# Patient Record
Sex: MALE | Race: BLACK OR AFRICAN AMERICAN
[De-identification: names, ages, dates, MRNs, and addresses within clinical notes are randomized per-mention and may not be internally consistent; named-entity substitution may affect disease eponyms.]

---

## 2018-03-19 ENCOUNTER — HOSPITAL ENCOUNTER (INPATIENT)
Dept: HOSPITAL 54 - ER | Age: 58
LOS: 3 days | Discharge: NURSING, REHAB OR LONG-TERM CARE | DRG: 377 | End: 2018-03-22
Attending: INTERNAL MEDICINE | Admitting: INTERNAL MEDICINE
Payer: MEDICARE

## 2018-03-19 VITALS — SYSTOLIC BLOOD PRESSURE: 110 MMHG | DIASTOLIC BLOOD PRESSURE: 82 MMHG

## 2018-03-19 VITALS — HEIGHT: 65 IN | WEIGHT: 184 LBS | BODY MASS INDEX: 30.66 KG/M2

## 2018-03-19 VITALS — DIASTOLIC BLOOD PRESSURE: 75 MMHG | SYSTOLIC BLOOD PRESSURE: 102 MMHG

## 2018-03-19 VITALS — DIASTOLIC BLOOD PRESSURE: 77 MMHG | SYSTOLIC BLOOD PRESSURE: 110 MMHG

## 2018-03-19 VITALS — DIASTOLIC BLOOD PRESSURE: 74 MMHG | SYSTOLIC BLOOD PRESSURE: 110 MMHG
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
## 2018-03-19 DIAGNOSIS — D62: ICD-10-CM

## 2018-03-19 DIAGNOSIS — Z93.1: ICD-10-CM

## 2018-03-19 DIAGNOSIS — N39.0: ICD-10-CM

## 2018-03-19 DIAGNOSIS — E43: ICD-10-CM

## 2018-03-19 DIAGNOSIS — R13.10: ICD-10-CM

## 2018-03-19 DIAGNOSIS — E11.22: ICD-10-CM

## 2018-03-19 DIAGNOSIS — K29.71: Primary | ICD-10-CM

## 2018-03-19 DIAGNOSIS — F03.90: ICD-10-CM

## 2018-03-19 DIAGNOSIS — R53.2: ICD-10-CM

## 2018-03-19 DIAGNOSIS — Z99.11: ICD-10-CM

## 2018-03-19 DIAGNOSIS — Z93.0: ICD-10-CM

## 2018-03-19 DIAGNOSIS — E86.0: ICD-10-CM

## 2018-03-19 DIAGNOSIS — M62.50: ICD-10-CM

## 2018-03-19 DIAGNOSIS — J96.11: ICD-10-CM

## 2018-03-19 DIAGNOSIS — M24.571: ICD-10-CM

## 2018-03-19 DIAGNOSIS — B95.4: ICD-10-CM

## 2018-03-19 DIAGNOSIS — E88.09: ICD-10-CM

## 2018-03-19 DIAGNOSIS — Z86.73: ICD-10-CM

## 2018-03-19 DIAGNOSIS — D50.0: ICD-10-CM

## 2018-03-19 DIAGNOSIS — E87.2: ICD-10-CM

## 2018-03-19 DIAGNOSIS — S01.311A: ICD-10-CM

## 2018-03-19 DIAGNOSIS — F09: ICD-10-CM

## 2018-03-19 DIAGNOSIS — N18.9: ICD-10-CM
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
## 2018-03-19 DIAGNOSIS — R65.10: ICD-10-CM

## 2018-03-19 DIAGNOSIS — D68.59: ICD-10-CM

## 2018-03-19 DIAGNOSIS — L89.610: ICD-10-CM

## 2018-03-19 DIAGNOSIS — D72.829: ICD-10-CM

## 2018-03-19 DIAGNOSIS — G93.1: ICD-10-CM

## 2018-03-19 DIAGNOSIS — R40.3: ICD-10-CM
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
## 2018-03-19 DIAGNOSIS — M24.572: ICD-10-CM

## 2018-03-19 DIAGNOSIS — Z79.4: ICD-10-CM

## 2018-03-19 LAB
ALBUMIN SERPL BCP-MCNC: 3.1 G/DL (ref 3.4–5)
ALP SERPL-CCNC: 55 U/L (ref 46–116)
ALT SERPL W P-5'-P-CCNC: 30 U/L (ref 12–78)
APTT PPP: 24 SEC (ref 23–34)
AST SERPL W P-5'-P-CCNC: 20 U/L (ref 15–37)
BASOPHILS # BLD AUTO: 0.1 /CMM (ref 0–0.2)
BASOPHILS NFR BLD AUTO: 0.8 % (ref 0–2)
BILIRUB DIRECT SERPL-MCNC: 0.1 MG/DL (ref 0–0.2)
BILIRUB SERPL-MCNC: 0.2 MG/DL (ref 0.2–1)
BUN SERPL-MCNC: 15 MG/DL (ref 7–18)
CALCIUM SERPL-MCNC: 9.1 MG/DL (ref 8.5–10.1)
CHLORIDE SERPL-SCNC: 107 MMOL/L (ref 98–107)
CO2 SERPL-SCNC: 29 MMOL/L (ref 21–32)
CREAT SERPL-MCNC: 1 MG/DL (ref 0.6–1.3)
EOSINOPHIL # BLD AUTO: 0.3 /CMM (ref 0–0.7)
EOSINOPHIL NFR BLD AUTO: 2.5 % (ref 0–6)
GLUCOSE SERPL-MCNC: 197 MG/DL (ref 74–106)
HCT VFR BLD AUTO: 35 % (ref 39–51)
HGB BLD-MCNC: 11.4 G/DL (ref 13.5–17.5)
INR PPP: 1.09 (ref 0.87–1.13)
LIPASE SERPL-CCNC: 88 U/L (ref 73–393)
LYMPHOCYTES NFR BLD AUTO: 2.8 /CMM (ref 0.8–4.8)
LYMPHOCYTES NFR BLD AUTO: 24.9 % (ref 20–44)
MCH RBC QN AUTO: 29 PG (ref 26–33)
MCHC RBC AUTO-ENTMCNC: 33 G/DL (ref 31–36)
MCV RBC AUTO: 88 FL (ref 80–96)
MONOCYTES NFR BLD AUTO: 0.7 /CMM (ref 0.1–1.3)
MONOCYTES NFR BLD AUTO: 6.7 % (ref 2–12)
NEUTROPHILS # BLD AUTO: 7.2 /CMM (ref 1.8–8.9)
NEUTROPHILS NFR BLD AUTO: 65.1 % (ref 43–81)
PH UR STRIP: 8.5 [PH] (ref 5–8)
PLATELET # BLD AUTO: 283 /CMM (ref 150–450)
POTASSIUM SERPL-SCNC: 4 MMOL/L (ref 3.5–5.1)
PROT SERPL-MCNC: 8.2 G/DL (ref 6.4–8.2)
RBC # BLD AUTO: 3.97 MIL/UL (ref 4.5–6)
RBC #/AREA URNS HPF: (no result) /HPF (ref 0–2)
RDW COEFFICIENT OF VARIATION: 16.7 (ref 11.5–15)
SODIUM SERPL-SCNC: 144 MMOL/L (ref 136–145)
TROPONIN I SERPL-MCNC: < 0.017 NG/ML (ref 0–0.06)
UROBILINOGEN UR STRIP-MCNC: 0.2 EU/DL
WBC #/AREA URNS HPF: (no result) /HPF (ref 0–3)
WBC NRBC COR # BLD AUTO: 11.1 K/UL (ref 4.3–11)

## 2018-03-19 PROCEDURE — A4216 STERILE WATER/SALINE, 10 ML: HCPCS

## 2018-03-19 PROCEDURE — Z7610: HCPCS

## 2018-03-19 PROCEDURE — A4606 OXYGEN PROBE USED W OXIMETER: HCPCS

## 2018-03-19 PROCEDURE — C1751 CATH, INF, PER/CENT/MIDLINE: HCPCS

## 2018-03-19 PROCEDURE — A6403 STERILE GAUZE>16 <= 48 SQ IN: HCPCS

## 2018-03-19 PROCEDURE — C9113 INJ PANTOPRAZOLE SODIUM, VIA: HCPCS

## 2018-03-19 PROCEDURE — A4349 DISPOSABLE MALE EXTERNAL CAT: HCPCS

## 2018-03-19 RX ADMIN — SODIUM CHLORIDE SCH MG: 9 INJECTION, SOLUTION INTRAVENOUS at 21:57

## 2018-03-19 RX ADMIN — HUMAN INSULIN PRN UNIT: 100 INJECTION, SOLUTION SUBCUTANEOUS at 22:11

## 2018-03-19 RX ADMIN — Medication SCH EACH: at 12:59

## 2018-03-19 RX ADMIN — DEXTROSE AND SODIUM CHLORIDE PRN MLS/HR: 5; 450 INJECTION, SOLUTION INTRAVENOUS at 13:00

## 2018-03-19 RX ADMIN — Medication SCH EACH: at 21:57

## 2018-03-19 RX ADMIN — SODIUM CHLORIDE SCH MG: 9 INJECTION, SOLUTION INTRAVENOUS at 12:59

## 2018-03-19 RX ADMIN — INSULIN HUMAN PRN UNIT: 100 INJECTION, SOLUTION PARENTERAL at 18:42

## 2018-03-19 RX ADMIN — Medication SCH EACH: at 18:41

## 2018-03-19 RX ADMIN — QUETIAPINE SCH MG: 25 TABLET, FILM COATED ORAL at 21:57

## 2018-03-19 RX ADMIN — INSULIN HUMAN PRN UNIT: 100 INJECTION, SOLUTION PARENTERAL at 14:29

## 2018-03-19 NOTE — NUR
CONOR FROM NURSING HOME DT GROUND COFFEE EMESIS SINCE THIS AM. PATIENT IS 
OBTUNDED, NOTED WITH TRACHE ON COOL AEROSOL. SATING WELL. PATIENT IS AFEBRILE. 
NO ABDOMINAL DISTENTION NOTED. AFEBRILE. VSS

## 2018-03-19 NOTE — NUR
TELE RN INITIAL NOTE



RECEIVED IN BED WITH OPEN EYES. HOB ELEVATED. TRACH WITH COOL AEROSOL AND SATURATING WELL. 
ON ASPIRATION PRECAUTIONS. GTUBE CLAMPED. NPO STATUS. NO FACIAL GRIMACE NOTED. IV R HAND 
CLEAN, INTACT WITH FLUIDS INFUSING. WILL CONTINUE TO MONITOR.

## 2018-03-19 NOTE — NUR
rn note

received pt on bed, obtunded, opens eyes, responses to pain, no sob, noted, suctioned prn, 
trach in place, on tele monitor, sr 67 bpm, g tube in place, clamped, no residual, wounds 
present, iv in r hand 20 g, intact, safety measures implemented, call light within reach, 
bed in low and locked position, bed alarm on. will monitor pt.

## 2018-03-20 VITALS — SYSTOLIC BLOOD PRESSURE: 103 MMHG | DIASTOLIC BLOOD PRESSURE: 67 MMHG

## 2018-03-20 VITALS — DIASTOLIC BLOOD PRESSURE: 75 MMHG | SYSTOLIC BLOOD PRESSURE: 110 MMHG

## 2018-03-20 VITALS — DIASTOLIC BLOOD PRESSURE: 80 MMHG | SYSTOLIC BLOOD PRESSURE: 110 MMHG

## 2018-03-20 VITALS — DIASTOLIC BLOOD PRESSURE: 85 MMHG | SYSTOLIC BLOOD PRESSURE: 122 MMHG

## 2018-03-20 VITALS — SYSTOLIC BLOOD PRESSURE: 118 MMHG | DIASTOLIC BLOOD PRESSURE: 76 MMHG

## 2018-03-20 VITALS — SYSTOLIC BLOOD PRESSURE: 115 MMHG | DIASTOLIC BLOOD PRESSURE: 77 MMHG

## 2018-03-20 VITALS — SYSTOLIC BLOOD PRESSURE: 110 MMHG | DIASTOLIC BLOOD PRESSURE: 80 MMHG

## 2018-03-20 LAB
BASOPHILS # BLD AUTO: 0.1 /CMM (ref 0–0.2)
BASOPHILS NFR BLD AUTO: 0.7 % (ref 0–2)
BUN SERPL-MCNC: 11 MG/DL (ref 7–18)
CALCIUM SERPL-MCNC: 8.3 MG/DL (ref 8.5–10.1)
CHLORIDE SERPL-SCNC: 108 MMOL/L (ref 98–107)
CO2 SERPL-SCNC: 25 MMOL/L (ref 21–32)
CREAT SERPL-MCNC: 0.8 MG/DL (ref 0.6–1.3)
EOSINOPHIL # BLD AUTO: 0.5 /CMM (ref 0–0.7)
EOSINOPHIL NFR BLD AUTO: 5.6 % (ref 0–6)
GLUCOSE SERPL-MCNC: 179 MG/DL (ref 74–106)
HCT VFR BLD AUTO: 35 % (ref 39–51)
HGB BLD-MCNC: 11.5 G/DL (ref 13.5–17.5)
LYMPHOCYTES NFR BLD AUTO: 3.7 /CMM (ref 0.8–4.8)
LYMPHOCYTES NFR BLD AUTO: 43.4 % (ref 20–44)
MAGNESIUM SERPL-MCNC: 1.9 MG/DL (ref 1.8–2.4)
MCH RBC QN AUTO: 30 PG (ref 26–33)
MCHC RBC AUTO-ENTMCNC: 33 G/DL (ref 31–36)
MCV RBC AUTO: 89 FL (ref 80–96)
MONOCYTES NFR BLD AUTO: 0.9 /CMM (ref 0.1–1.3)
MONOCYTES NFR BLD AUTO: 10.6 % (ref 2–12)
NEUTROPHILS # BLD AUTO: 3.4 /CMM (ref 1.8–8.9)
NEUTROPHILS NFR BLD AUTO: 39.7 % (ref 43–81)
PHOSPHATE SERPL-MCNC: 3.2 MG/DL (ref 2.5–4.9)
PLATELET # BLD AUTO: 228 /CMM (ref 150–450)
POTASSIUM SERPL-SCNC: 4.2 MMOL/L (ref 3.5–5.1)
RBC # BLD AUTO: 3.89 MIL/UL (ref 4.5–6)
RDW COEFFICIENT OF VARIATION: 16.5 (ref 11.5–15)
SODIUM SERPL-SCNC: 142 MMOL/L (ref 136–145)
WBC NRBC COR # BLD AUTO: 8.6 K/UL (ref 4.3–11)

## 2018-03-20 RX ADMIN — DEXTROSE AND SODIUM CHLORIDE PRN MLS/HR: 5; 450 INJECTION, SOLUTION INTRAVENOUS at 02:28

## 2018-03-20 RX ADMIN — Medication SCH EACH: at 21:33

## 2018-03-20 RX ADMIN — Medication SCH EACH: at 08:29

## 2018-03-20 RX ADMIN — Medication SCH EACH: at 11:30

## 2018-03-20 RX ADMIN — SODIUM CHLORIDE SCH MG: 9 INJECTION, SOLUTION INTRAVENOUS at 08:29

## 2018-03-20 RX ADMIN — Medication SCH EACH: at 16:36

## 2018-03-20 RX ADMIN — CITALOPRAM HYDROBROMIDE SCH MG: 10 TABLET ORAL at 08:26

## 2018-03-20 RX ADMIN — DEXTROSE AND SODIUM CHLORIDE PRN MLS/HR: 5; 450 INJECTION, SOLUTION INTRAVENOUS at 16:38

## 2018-03-20 RX ADMIN — Medication PRN ML: at 20:00

## 2018-03-20 RX ADMIN — SODIUM CHLORIDE SCH MG: 9 INJECTION, SOLUTION INTRAVENOUS at 21:15

## 2018-03-20 RX ADMIN — HUMAN INSULIN PRN UNIT: 100 INJECTION, SOLUTION SUBCUTANEOUS at 21:34

## 2018-03-20 RX ADMIN — QUETIAPINE SCH MG: 25 TABLET, FILM COATED ORAL at 21:15

## 2018-03-20 NOTE — NUR
TELE RN CLOSING NOTE



NO ACUTE DISTRESS NOTED. ALL NEEDS ATTENDED TO PROMPTLY. SUCTIONED AS NEEDED. REPOSITIONED 
Q2H. KEPT CLEAN AND DRY. NPO MAINTAINED. GTUBE IN PLACE AND CLAMPED. HOB ELEVATED. WILL 
ENDORSE TO NEXT SHIFT FOR CONTINUITY OF CARE.

## 2018-03-20 NOTE — NUR
MS/RN   S/B GI



Seen by GI - resume GT feedings, and then NPO from midnight for EGD tomorrow. Consent 
obtained from patient's wife Beverly.

## 2018-03-20 NOTE — NUR
MS/RN   End note



No changes in condition, awaiting feeding to be sent by [facility]. Safety measures in place, 
will endorse to night shift.

## 2018-03-20 NOTE — NUR
WOUND CARE CONSULT: PT PRESENTS WITH RT HEEL DEEP TISSUE INJURY (INTACT), DRY ABRASION TO RT 
EARLOBE EDGE NEAR FACE WITHOUT DRAINAGE, SACRAL SCARRING AND LEFT ANTERIOR LOWER LEG 
SCARRING, PRESENT ON ADMISSION. PT IS IMMOBILE WITH ARNULFO SCORE OF 11. PT ON ALEXUS 
ISOFLEX LOW AIRLOSS BED. ALL SKIN PROTECTION AND WOUND CARE RECOMMENDATIONS DISCUSSED WITH 
NURSING STAFF. WILL SEE PRN. MD IN AGREEMENT WITH PLAN OF CARE. 

-------------------------------------------------------------------------------

Addendum: 03/20/18 at 0941 by SHAHNAZ MOSHER WNDNU

-------------------------------------------------------------------------------

Amended: Links added.

## 2018-03-20 NOTE — NUR
MS/RN   Patient received



Patient received from night shift.

Trach to cool aerosol at 10 liters (portex #9), saturating 98%. Vital signs within normal 
range, no fever noted, also no signs of any active bleeding. Patient requiring turning every 
2-3 hours to prevent skin breakdown. Safety measures in place, will continue to monitor and 
ensure safety.

## 2018-03-20 NOTE — NUR
MS/RN   Medications



Morning IV medications administered as ordered, all oral medications held as patient remains 
NPO due to GI bleed.

## 2018-03-21 VITALS — SYSTOLIC BLOOD PRESSURE: 119 MMHG | DIASTOLIC BLOOD PRESSURE: 90 MMHG

## 2018-03-21 VITALS — DIASTOLIC BLOOD PRESSURE: 75 MMHG | SYSTOLIC BLOOD PRESSURE: 113 MMHG

## 2018-03-21 VITALS — DIASTOLIC BLOOD PRESSURE: 77 MMHG | SYSTOLIC BLOOD PRESSURE: 119 MMHG

## 2018-03-21 VITALS — SYSTOLIC BLOOD PRESSURE: 120 MMHG | DIASTOLIC BLOOD PRESSURE: 85 MMHG

## 2018-03-21 VITALS — DIASTOLIC BLOOD PRESSURE: 85 MMHG | SYSTOLIC BLOOD PRESSURE: 117 MMHG

## 2018-03-21 VITALS — DIASTOLIC BLOOD PRESSURE: 73 MMHG | SYSTOLIC BLOOD PRESSURE: 120 MMHG

## 2018-03-21 LAB
BASOPHILS # BLD AUTO: 0.1 /CMM (ref 0–0.2)
BASOPHILS NFR BLD AUTO: 1 % (ref 0–2)
BUN SERPL-MCNC: 9 MG/DL (ref 7–18)
CALCIUM SERPL-MCNC: 8.3 MG/DL (ref 8.5–10.1)
CHLORIDE SERPL-SCNC: 108 MMOL/L (ref 98–107)
CO2 SERPL-SCNC: 25 MMOL/L (ref 21–32)
CREAT SERPL-MCNC: 0.8 MG/DL (ref 0.6–1.3)
EOSINOPHIL # BLD AUTO: 0.5 /CMM (ref 0–0.7)
EOSINOPHIL NFR BLD AUTO: 5.9 % (ref 0–6)
GLUCOSE SERPL-MCNC: 179 MG/DL (ref 74–106)
HCT VFR BLD AUTO: 38 % (ref 39–51)
HGB BLD-MCNC: 12.5 G/DL (ref 13.5–17.5)
LYMPHOCYTES NFR BLD AUTO: 3.5 /CMM (ref 0.8–4.8)
LYMPHOCYTES NFR BLD AUTO: 43.4 % (ref 20–44)
MAGNESIUM SERPL-MCNC: 2.1 MG/DL (ref 1.8–2.4)
MCH RBC QN AUTO: 29 PG (ref 26–33)
MCHC RBC AUTO-ENTMCNC: 33 G/DL (ref 31–36)
MCV RBC AUTO: 89 FL (ref 80–96)
MONOCYTES NFR BLD AUTO: 0.9 /CMM (ref 0.1–1.3)
MONOCYTES NFR BLD AUTO: 11.3 % (ref 2–12)
NEUTROPHILS # BLD AUTO: 3.1 /CMM (ref 1.8–8.9)
NEUTROPHILS NFR BLD AUTO: 38.4 % (ref 43–81)
PLATELET # BLD AUTO: 254 /CMM (ref 150–450)
POTASSIUM SERPL-SCNC: 4 MMOL/L (ref 3.5–5.1)
RBC # BLD AUTO: 4.24 MIL/UL (ref 4.5–6)
RDW COEFFICIENT OF VARIATION: 16.3 (ref 11.5–15)
SODIUM SERPL-SCNC: 142 MMOL/L (ref 136–145)
WBC NRBC COR # BLD AUTO: 8 K/UL (ref 4.3–11)

## 2018-03-21 PROCEDURE — 0DB68ZX EXCISION OF STOMACH, VIA NATURAL OR ARTIFICIAL OPENING ENDOSCOPIC, DIAGNOSTIC: ICD-10-PCS

## 2018-03-21 RX ADMIN — Medication SCH EACH: at 12:22

## 2018-03-21 RX ADMIN — NEOMYCIN AND POLYMYXIN B SULFATES AND BACITRACIN ZINC SCH GM: 400; 3.5; 5 OINTMENT TOPICAL at 09:00

## 2018-03-21 RX ADMIN — SODIUM CHLORIDE SCH MG: 9 INJECTION, SOLUTION INTRAVENOUS at 21:57

## 2018-03-21 RX ADMIN — INSULIN HUMAN PRN UNIT: 100 INJECTION, SOLUTION PARENTERAL at 12:43

## 2018-03-21 RX ADMIN — DEXTROSE AND SODIUM CHLORIDE PRN MLS/HR: 5; 450 INJECTION, SOLUTION INTRAVENOUS at 22:51

## 2018-03-21 RX ADMIN — QUETIAPINE SCH MG: 25 TABLET, FILM COATED ORAL at 21:57

## 2018-03-21 RX ADMIN — DEXTROSE AND SODIUM CHLORIDE PRN MLS/HR: 5; 450 INJECTION, SOLUTION INTRAVENOUS at 04:41

## 2018-03-21 RX ADMIN — Medication SCH EACH: at 07:57

## 2018-03-21 RX ADMIN — Medication SCH EACH: at 21:57

## 2018-03-21 RX ADMIN — INSULIN HUMAN PRN UNIT: 100 INJECTION, SOLUTION PARENTERAL at 17:47

## 2018-03-21 RX ADMIN — Medication SCH EACH: at 17:42

## 2018-03-21 RX ADMIN — CITALOPRAM HYDROBROMIDE SCH MG: 10 TABLET ORAL at 08:13

## 2018-03-21 RX ADMIN — SODIUM CHLORIDE SCH MG: 9 INJECTION, SOLUTION INTRAVENOUS at 08:10

## 2018-03-21 NOTE — NUR
TELE RN OPENING NOTES

RECEIVED REPORT FROM AM RN. PATIENT OBTUNDED W/ TRACH INTACT. TOLERATING COOL AEROSOL, 
SATING @ %. ON TELE W/ SINUS RHYTHM, HR IN THE 80S. LEFT HAND IV #20 INTACT & PATENT 
W/ D5 1/2 NS @ 75 ML/HR. G-TUBE INTACT & FLUSHING WELL W/ GLYTROL @ 70 ML/HR. NO RESIDUAL 
NOTED @ THIS TIME. SKIN WARM, DRY & INTACT. NO S/S OF PAIN OR DISCOMFORT @ THIS TIME. SAFETY 
MEASURES IN PLACE W/ SIDE RAILS UP & BED LOCKED & IN LOWEST POSITION. WILL CONTINUE TO 
MONITOR.

## 2018-03-21 NOTE — NUR
RN NOTES

RECEIVED PT OBTUNDED, TRACH IN PLACE ON AEROSOL. GT CLAMPED, KEPT NPO. NO BLEEDING NOTED AT 
THIS TIME. SAFETY ENSURED SINUS JEANNIE ON THE MONITOR.

## 2018-03-21 NOTE — NUR
RN CLOSING NOTES

ENDORSED PT IN STABLE CONDITION; NO BLEEDING. IVF INFUSING WELL . GT FEEDING TOLERATED WELL; 
OB KEPT ELEVATED

## 2018-03-22 VITALS — DIASTOLIC BLOOD PRESSURE: 75 MMHG | SYSTOLIC BLOOD PRESSURE: 117 MMHG

## 2018-03-22 VITALS — SYSTOLIC BLOOD PRESSURE: 123 MMHG | DIASTOLIC BLOOD PRESSURE: 84 MMHG

## 2018-03-22 VITALS — DIASTOLIC BLOOD PRESSURE: 84 MMHG | SYSTOLIC BLOOD PRESSURE: 119 MMHG

## 2018-03-22 VITALS — SYSTOLIC BLOOD PRESSURE: 129 MMHG | DIASTOLIC BLOOD PRESSURE: 86 MMHG

## 2018-03-22 VITALS — DIASTOLIC BLOOD PRESSURE: 87 MMHG | SYSTOLIC BLOOD PRESSURE: 125 MMHG

## 2018-03-22 LAB
BASOPHILS # BLD AUTO: 0.1 /CMM (ref 0–0.2)
BASOPHILS NFR BLD AUTO: 1.1 % (ref 0–2)
BUN SERPL-MCNC: 8 MG/DL (ref 7–18)
CALCIUM SERPL-MCNC: 8 MG/DL (ref 8.5–10.1)
CHLORIDE SERPL-SCNC: 107 MMOL/L (ref 98–107)
CO2 SERPL-SCNC: 24 MMOL/L (ref 21–32)
CREAT SERPL-MCNC: 0.8 MG/DL (ref 0.6–1.3)
EOSINOPHIL # BLD AUTO: 0.4 /CMM (ref 0–0.7)
EOSINOPHIL NFR BLD AUTO: 4.7 % (ref 0–6)
GLUCOSE SERPL-MCNC: 219 MG/DL (ref 74–106)
HCT VFR BLD AUTO: 36 % (ref 39–51)
HGB BLD-MCNC: 11.9 G/DL (ref 13.5–17.5)
LYMPHOCYTES NFR BLD AUTO: 3.3 /CMM (ref 0.8–4.8)
LYMPHOCYTES NFR BLD AUTO: 37 % (ref 20–44)
MCH RBC QN AUTO: 29 PG (ref 26–33)
MCHC RBC AUTO-ENTMCNC: 33 G/DL (ref 31–36)
MCV RBC AUTO: 88 FL (ref 80–96)
MONOCYTES NFR BLD AUTO: 0.7 /CMM (ref 0.1–1.3)
MONOCYTES NFR BLD AUTO: 7.9 % (ref 2–12)
NEUTROPHILS # BLD AUTO: 4.4 /CMM (ref 1.8–8.9)
NEUTROPHILS NFR BLD AUTO: 49.3 % (ref 43–81)
PLATELET # BLD AUTO: 254 /CMM (ref 150–450)
POTASSIUM SERPL-SCNC: 4 MMOL/L (ref 3.5–5.1)
RBC # BLD AUTO: 4.08 MIL/UL (ref 4.5–6)
RDW COEFFICIENT OF VARIATION: 16.8 (ref 11.5–15)
SODIUM SERPL-SCNC: 140 MMOL/L (ref 136–145)
WBC NRBC COR # BLD AUTO: 8.9 K/UL (ref 4.3–11)

## 2018-03-22 PROCEDURE — B547ZZA ULTRASONOGRAPHY OF LEFT SUBCLAVIAN VEIN, GUIDANCE: ICD-10-PCS | Performed by: NURSE PRACTITIONER

## 2018-03-22 PROCEDURE — 05H633Z INSERTION OF INFUSION DEVICE INTO LEFT SUBCLAVIAN VEIN, PERCUTANEOUS APPROACH: ICD-10-PCS | Performed by: NURSE PRACTITIONER

## 2018-03-22 RX ADMIN — Medication SCH EACH: at 12:09

## 2018-03-22 RX ADMIN — Medication SCH EACH: at 08:18

## 2018-03-22 RX ADMIN — INSULIN HUMAN PRN UNIT: 100 INJECTION, SOLUTION PARENTERAL at 09:18

## 2018-03-22 RX ADMIN — CITALOPRAM HYDROBROMIDE SCH MG: 10 TABLET ORAL at 09:15

## 2018-03-22 RX ADMIN — INSULIN HUMAN PRN UNIT: 100 INJECTION, SOLUTION PARENTERAL at 12:12

## 2018-03-22 RX ADMIN — NEOMYCIN AND POLYMYXIN B SULFATES AND BACITRACIN ZINC SCH GM: 400; 3.5; 5 OINTMENT TOPICAL at 09:15

## 2018-03-22 RX ADMIN — Medication PRN ML: at 01:26

## 2018-03-22 RX ADMIN — SODIUM CHLORIDE SCH MG: 9 INJECTION, SOLUTION INTRAVENOUS at 09:15

## 2018-03-22 NOTE — NUR
RN NOTE

 PT DISCHARGED TO [facility] REPORT GIVEN TO DESIREE BOLES, [phone], PT STABLE, 
PICTURES TAKEN, PT HAD NO BELONGINGS, EXIT CARE DONE, DISCHARGE INSTRUCTIONS AND PAPERWORK 
GIVEN TO AMBULANCE, WIFE IS AWARE, PT HAD CONDOM CATH, G TUBE AND MIDLINE IN PLACE. 
PERIPHERAL IV AND ID BANDS REMOVED, AND PT LEFT VIA AMBULANCE.

## 2018-06-16 ENCOUNTER — HOSPITAL ENCOUNTER (INPATIENT)
Dept: HOSPITAL 54 - ER | Age: 58
LOS: 11 days | Discharge: NURSING, REHAB OR LONG-TERM CARE | DRG: 870 | End: 2018-06-27
Attending: NURSE PRACTITIONER | Admitting: NURSE PRACTITIONER
Payer: MEDICARE

## 2018-06-16 VITALS — WEIGHT: 208 LBS | BODY MASS INDEX: 32.65 KG/M2 | HEIGHT: 67 IN

## 2018-06-16 VITALS — DIASTOLIC BLOOD PRESSURE: 69 MMHG | SYSTOLIC BLOOD PRESSURE: 107 MMHG

## 2018-06-16 DIAGNOSIS — Z79.899: ICD-10-CM

## 2018-06-16 DIAGNOSIS — J32.0: ICD-10-CM

## 2018-06-16 DIAGNOSIS — R53.2: ICD-10-CM

## 2018-06-16 DIAGNOSIS — L89.610: ICD-10-CM

## 2018-06-16 DIAGNOSIS — E87.1: ICD-10-CM

## 2018-06-16 DIAGNOSIS — K76.9: ICD-10-CM

## 2018-06-16 DIAGNOSIS — Z87.820: ICD-10-CM

## 2018-06-16 DIAGNOSIS — J96.21: ICD-10-CM

## 2018-06-16 DIAGNOSIS — K56.7: ICD-10-CM

## 2018-06-16 DIAGNOSIS — J98.11: ICD-10-CM

## 2018-06-16 DIAGNOSIS — G93.41: ICD-10-CM

## 2018-06-16 DIAGNOSIS — E11.9: ICD-10-CM

## 2018-06-16 DIAGNOSIS — A41.9: Primary | ICD-10-CM

## 2018-06-16 DIAGNOSIS — Z93.0: ICD-10-CM

## 2018-06-16 DIAGNOSIS — R13.10: ICD-10-CM

## 2018-06-16 DIAGNOSIS — N39.0: ICD-10-CM

## 2018-06-16 DIAGNOSIS — D62: ICD-10-CM

## 2018-06-16 DIAGNOSIS — M62.479: ICD-10-CM

## 2018-06-16 DIAGNOSIS — B96.4: ICD-10-CM

## 2018-06-16 DIAGNOSIS — K21.9: ICD-10-CM

## 2018-06-16 DIAGNOSIS — D72.1: ICD-10-CM

## 2018-06-16 DIAGNOSIS — G40.909: ICD-10-CM

## 2018-06-16 DIAGNOSIS — E87.6: ICD-10-CM

## 2018-06-16 DIAGNOSIS — N17.0: ICD-10-CM

## 2018-06-16 DIAGNOSIS — E87.2: ICD-10-CM

## 2018-06-16 DIAGNOSIS — Z99.11: ICD-10-CM

## 2018-06-16 DIAGNOSIS — E83.39: ICD-10-CM

## 2018-06-16 DIAGNOSIS — E43: ICD-10-CM

## 2018-06-16 DIAGNOSIS — Z79.4: ICD-10-CM

## 2018-06-16 DIAGNOSIS — E11.621: ICD-10-CM

## 2018-06-16 DIAGNOSIS — K92.2: ICD-10-CM

## 2018-06-16 DIAGNOSIS — J01.00: ICD-10-CM

## 2018-06-16 DIAGNOSIS — E87.5: ICD-10-CM

## 2018-06-16 DIAGNOSIS — Z93.1: ICD-10-CM

## 2018-06-16 DIAGNOSIS — J15.9: ICD-10-CM

## 2018-06-16 DIAGNOSIS — G93.1: ICD-10-CM

## 2018-06-16 DIAGNOSIS — E11.65: ICD-10-CM

## 2018-06-16 DIAGNOSIS — J15.6: ICD-10-CM

## 2018-06-16 DIAGNOSIS — D68.59: ICD-10-CM

## 2018-06-16 DIAGNOSIS — E86.1: ICD-10-CM

## 2018-06-16 DIAGNOSIS — F09: ICD-10-CM

## 2018-06-16 DIAGNOSIS — L97.419: ICD-10-CM

## 2018-06-16 DIAGNOSIS — R65.20: ICD-10-CM

## 2018-06-16 LAB
BUN SERPL-MCNC: 13 MG/DL (ref 7–18)
CALCIUM SERPL-MCNC: 8.5 MG/DL (ref 8.5–10.1)
CHLORIDE SERPL-SCNC: 98 MMOL/L (ref 98–107)
CO2 SERPL-SCNC: 22 MMOL/L (ref 21–32)
CREAT SERPL-MCNC: 1.1 MG/DL (ref 0.6–1.3)
GLUCOSE SERPL-MCNC: 357 MG/DL (ref 74–106)
HCT VFR BLD AUTO: 44 % (ref 39–51)
HGB BLD-MCNC: 14.5 G/DL (ref 13.5–17.5)
LYMPHOCYTES NFR BLD MANUAL: 9 % (ref 16–48)
MCHC RBC AUTO-ENTMCNC: 33 G/DL (ref 31–36)
MCV RBC AUTO: 90 FL (ref 80–96)
MONOCYTES NFR BLD MANUAL: 4 % (ref 0–11)
NEUTS BAND NFR BLD MANUAL: 15 % (ref 0–5)
NEUTS SEG NFR BLD MANUAL: 72 % (ref 42–76)
PH UR STRIP: 7.5 [PH] (ref 5–8)
PLATELET # BLD AUTO: 244 /CMM (ref 150–450)
POTASSIUM SERPL-SCNC: 4.6 MMOL/L (ref 3.5–5.1)
RBC # BLD AUTO: 4.86 MIL/UL (ref 4.5–6)
RBC #/AREA URNS HPF: (no result) /HPF (ref 0–2)
RDW COEFFICIENT OF VARIATION: 17.7 (ref 11.5–15)
SODIUM SERPL-SCNC: 133 MMOL/L (ref 136–145)
UROBILINOGEN UR STRIP-MCNC: 0.2 EU/DL
WBC #/AREA URNS HPF: (no result) /HPF
WBC #/AREA URNS HPF: (no result) /HPF (ref 0–3)
WBC NRBC COR # BLD AUTO: 17.6 K/UL (ref 4.3–11)

## 2018-06-16 PROCEDURE — A6403 STERILE GAUZE>16 <= 48 SQ IN: HCPCS

## 2018-06-16 PROCEDURE — Z7610: HCPCS

## 2018-06-16 PROCEDURE — A4216 STERILE WATER/SALINE, 10 ML: HCPCS

## 2018-06-16 PROCEDURE — C9113 INJ PANTOPRAZOLE SODIUM, VIA: HCPCS

## 2018-06-16 PROCEDURE — 5A1955Z RESPIRATORY VENTILATION, GREATER THAN 96 CONSECUTIVE HOURS: ICD-10-PCS | Performed by: INTERNAL MEDICINE

## 2018-06-16 PROCEDURE — A4623 TRACHEOSTOMY INNER CANNULA: HCPCS

## 2018-06-16 PROCEDURE — A4606 OXYGEN PROBE USED W OXIMETER: HCPCS

## 2018-06-16 RX ADMIN — SODIUM CHLORIDE PRN MLS/HR: 9 INJECTION, SOLUTION INTRAVENOUS at 23:36

## 2018-06-16 NOTE — NUR
NOTED PT WITH FACIAL TWITCHING. ER MD MADE AWARE WITH ORDERS TO PLACE PT ON 
SEIZURE PRECAUTION. PLACE PT ON SEIZURE PRECAUTION WITH PADDED SIDERAILS.

## 2018-06-16 NOTE — NUR
RN NOTE

CRITICAL LAB VALUE LACTIC ACID 2.8, NOTIFIED TAHIRA CLIFFORD NP, NEW ORDER IS GIVEN AND CARRIED 
OUT, CHARGE NURSE IS AWARE

## 2018-06-16 NOTE — NUR
TO BED 8 BIB PARAMEDICS C/O FEVER AND TACHYCARDIA. PT CHRONIC TRACHE, ON T-TUBE 
MASK 15L. PLACE PT ON CARDIAC MONITORING, CONTINUOUS POX. SKI HOT TO TOUCH, 
NONDIAPHORETIC. PENDING ER SRIKANTH SILVA.

## 2018-06-16 NOTE — NUR
RN NOTE

RECEIVED PATIENT FROM ER, REPORT WAS PROVIDED BY ED, PATIENT IS LETHARGIC, DOES NOT FOLLOW 
COMMANDS ,DX SEPSIS DUE TO PNEUMONIA AND UTI, SINUS TACHYCARDIA ON THE MONITOR, LETHARGIC, 
HISTORY OF SEIZURE, SEIZURE PRECAUTIONS TAKEN, PEG TUBE IS INTACT, ON T-PIECE 6L/MIN, FIO2 
28%, NO RESPIRATORY DISTRESS NOTED,VITAL SIGNS TAKEN AND RECORDED, SKIN PICTURES TAKEN AND 
PLACED IN THE CHART, PELAYO CATH IS INTACT, DRAINS YELLOW, CLOUDY WITH SEDIMENT URINE, ALL 
SAFETY MEASURES TAKEN, BED IN THE LOWEST POSITION, CALL LIGHT WITHIN REACH, SIDE RAILS UP X 
2, WILL CONTINUE TO MONITOR PATIENT

## 2018-06-17 VITALS — SYSTOLIC BLOOD PRESSURE: 116 MMHG | DIASTOLIC BLOOD PRESSURE: 66 MMHG

## 2018-06-17 VITALS — SYSTOLIC BLOOD PRESSURE: 105 MMHG | DIASTOLIC BLOOD PRESSURE: 76 MMHG

## 2018-06-17 VITALS — DIASTOLIC BLOOD PRESSURE: 69 MMHG | SYSTOLIC BLOOD PRESSURE: 112 MMHG

## 2018-06-17 VITALS — DIASTOLIC BLOOD PRESSURE: 78 MMHG | SYSTOLIC BLOOD PRESSURE: 115 MMHG

## 2018-06-17 VITALS — SYSTOLIC BLOOD PRESSURE: 107 MMHG | DIASTOLIC BLOOD PRESSURE: 69 MMHG

## 2018-06-17 VITALS — SYSTOLIC BLOOD PRESSURE: 119 MMHG | DIASTOLIC BLOOD PRESSURE: 69 MMHG

## 2018-06-17 LAB
BASE EXCESS BLDA CALC-SCNC: -6.4 MMOL/L
BASOPHILS # BLD AUTO: 0.1 /CMM (ref 0–0.2)
BASOPHILS NFR BLD AUTO: 0.5 % (ref 0–2)
BILIRUB DIRECT SERPL-MCNC: 0 MG/DL (ref 0–0.2)
BILIRUB SERPL-MCNC: 0.4 MG/DL (ref 0.2–1)
BUN SERPL-MCNC: 12 MG/DL (ref 7–18)
CALCIUM SERPL-MCNC: 8.1 MG/DL (ref 8.5–10.1)
CHLORIDE SERPL-SCNC: 103 MMOL/L (ref 98–107)
CHOLEST SERPL-MCNC: 363 MG/DL (ref ?–200)
CO2 SERPL-SCNC: 21 MMOL/L (ref 21–32)
CREAT SERPL-MCNC: 1.1 MG/DL (ref 0.6–1.3)
DO-HGB MFR BLDA: 112.1 MMHG
EOSINOPHIL NFR BLD AUTO: 0.3 % (ref 0–6)
GLUCOSE SERPL-MCNC: 365 MG/DL (ref 74–106)
HCT VFR BLD AUTO: 42 % (ref 39–51)
HDLC SERPL-MCNC: 42 MG/DL (ref 40–60)
HGB BLD-MCNC: 14 G/DL (ref 13.5–17.5)
INHALED O2 CONCENTRATION: 28 %
INHALED O2 FLOW RATE: 5 L/MIN (ref 0–30)
LDLC SERPL DIRECT ASSAY-MCNC: 83 MG/DL (ref 0–99)
LYMPHOCYTES NFR BLD AUTO: 1.4 /CMM (ref 0.8–4.8)
LYMPHOCYTES NFR BLD AUTO: 10.6 % (ref 20–44)
MAGNESIUM SERPL-MCNC: 2 MG/DL (ref 1.8–2.4)
MCHC RBC AUTO-ENTMCNC: 33 G/DL (ref 31–36)
MCV RBC AUTO: 90 FL (ref 80–96)
MONOCYTES NFR BLD AUTO: 0.5 /CMM (ref 0.1–1.3)
MONOCYTES NFR BLD AUTO: 3.7 % (ref 2–12)
NEUTROPHILS # BLD AUTO: 11.5 /CMM (ref 1.8–8.9)
NEUTROPHILS NFR BLD AUTO: 84.9 % (ref 43–81)
PCO2 TEMP ADJ BLDA: 27.2 MMHG (ref 35–45)
PH TEMP ADJ BLDA: 7.41 [PH] (ref 7.35–7.45)
PHOSPHATE SERPL-MCNC: 2.2 MG/DL (ref 2.5–4.9)
PLATELET # BLD AUTO: 197 /CMM (ref 150–450)
PO2 TEMP ADJ BLDA: 55.4 MMHG (ref 75–100)
POTASSIUM SERPL-SCNC: 4.2 MMOL/L (ref 3.5–5.1)
RBC # BLD AUTO: 4.69 MIL/UL (ref 4.5–6)
RDW COEFFICIENT OF VARIATION: 17.9 (ref 11.5–15)
SAO2 % BLDA: 88.2 % (ref 92–98.5)
SODIUM SERPL-SCNC: 137 MMOL/L (ref 136–145)
TRIGL SERPL-MCNC: 1231 MG/DL (ref 30–150)
VENTILATION MODE VENT: (no result)
WBC NRBC COR # BLD AUTO: 13.5 K/UL (ref 4.3–11)

## 2018-06-17 RX ADMIN — ALBUTEROL SULFATE PRN MG: 2.5 SOLUTION RESPIRATORY (INHALATION) at 01:21

## 2018-06-17 RX ADMIN — Medication SCH EACH: at 00:52

## 2018-06-17 RX ADMIN — DEXTROSE MONOHYDRATE PRN MG: 50 INJECTION, SOLUTION INTRAVENOUS at 21:23

## 2018-06-17 RX ADMIN — DEXTROSE MONOHYDRATE SCH MLS/HR: 50 INJECTION, SOLUTION INTRAVENOUS at 22:01

## 2018-06-17 RX ADMIN — Medication PRN MG: at 13:22

## 2018-06-17 RX ADMIN — DEXTROSE MONOHYDRATE PRN MG: 50 INJECTION, SOLUTION INTRAVENOUS at 15:22

## 2018-06-17 RX ADMIN — DEXTROSE MONOHYDRATE SCH MLS/HR: 50 INJECTION, SOLUTION INTRAVENOUS at 16:06

## 2018-06-17 RX ADMIN — ACETAMINOPHEN PRN MG: 650 SUPPOSITORY RECTAL at 03:45

## 2018-06-17 RX ADMIN — Medication PRN MG: at 07:35

## 2018-06-17 RX ADMIN — SODIUM CHLORIDE PRN MLS/HR: 9 INJECTION, SOLUTION INTRAVENOUS at 15:21

## 2018-06-17 RX ADMIN — ACETAMINOPHEN PRN MG: 650 SUPPOSITORY RECTAL at 21:23

## 2018-06-17 RX ADMIN — ALBUTEROL SULFATE PRN MG: 2.5 SOLUTION RESPIRATORY (INHALATION) at 13:22

## 2018-06-17 RX ADMIN — INSULIN HUMAN PRN UNIT: 100 INJECTION, SOLUTION PARENTERAL at 05:39

## 2018-06-17 RX ADMIN — Medication PRN ML: at 00:50

## 2018-06-17 RX ADMIN — Medication SCH EACH: at 11:43

## 2018-06-17 RX ADMIN — PIPERACILLIN SODIUM AND TAZOBACTAM SODIUM SCH MLS/HR: .375; 3 INJECTION, POWDER, LYOPHILIZED, FOR SOLUTION INTRAVENOUS at 21:04

## 2018-06-17 RX ADMIN — DEXTROSE MONOHYDRATE PRN MG: 50 INJECTION, SOLUTION INTRAVENOUS at 09:57

## 2018-06-17 RX ADMIN — Medication PRN MG: at 01:21

## 2018-06-17 RX ADMIN — ENOXAPARIN SODIUM SCH MG: 40 INJECTION SUBCUTANEOUS at 21:05

## 2018-06-17 RX ADMIN — INSULIN HUMAN PRN UNIT: 100 INJECTION, SOLUTION PARENTERAL at 11:51

## 2018-06-17 RX ADMIN — INSULIN HUMAN PRN UNIT: 100 INJECTION, SOLUTION PARENTERAL at 00:57

## 2018-06-17 RX ADMIN — Medication SCH EACH: at 05:39

## 2018-06-17 RX ADMIN — ALBUTEROL SULFATE PRN MG: 2.5 SOLUTION RESPIRATORY (INHALATION) at 07:35

## 2018-06-17 RX ADMIN — INSULIN HUMAN PRN UNIT: 100 INJECTION, SOLUTION PARENTERAL at 18:31

## 2018-06-17 RX ADMIN — Medication SCH EACH: at 18:26

## 2018-06-17 NOTE — NUR
rn note

NOTIFIED NP GILDARDO ARMSTRONG ABOUT LOW URINE OUTPUT, 300 ML, HE ORDERED BOLUS  ML, 
PROCALCITONIN LEVEL REPORTED, HE ORDERED TO CHECK IT AGAIN IN AM. WILL ORDER AND CARRY OUT 
ORDERS. WILL ENDORSE TO NIGHT SHIFT NURSE.

## 2018-06-17 NOTE — NUR
RT NOTE: 



PT WAS PLACED ON VENT W SETTINGS NOTED PER DR. HOFFMANN. VENT IS PLUGGED INTO THE RED OUTLET W 
AMBUBAG BY BEDSIDE. PT IS TOLERATING WELL. NURSE AWARE. NO DISTRESS NOTED AT THIS TIME. WILL 
CONTINUE TO MONITOR.

-------------------------------------------------------------------------------

Addendum: 06/17/18 at 0550 by RENEE CANALES RT

-------------------------------------------------------------------------------

Amended: Links added.

## 2018-06-17 NOTE — NUR
RN NOTE

SO2 86% ON 6L/MIN, FIO2 28%, INCREASED OXYGEN, ELEVATED HOB, SO2 90%, NOTIFIED TAHIRA CLIFFORD 
NP, NEW ORDERS:STAT ABG'S, PUT ON MECHANICAL VENTILATOR GIVEN, CHARGE NURSE IS AWARE

## 2018-06-17 NOTE — NUR
RN NOTE

PT HAD GASTRIC  RESIDUAL 200 ML IN AM, AT NOON RESIDUAL 300 ML WITH TUBE FEEDING BEING ON 
HOLD, LATER PT VOMITED YELLOW LIQUID EMESIS X1. ZOFRAN GIVEN, MD NOTIFIED, XRAY KUB ORDERED. 
WILL MONITOR PT. SAFETY MEASURES IN PLACE.

## 2018-06-17 NOTE — NUR
RN NOTE

RESIDUAL OF 200CC NOTED, COFFEE GROUND COLOR, NOTIFIED TAHIRA CLIFFORD NP,  TAHIRA CLIFFORD NP ORDERED 
NPO STATUS, ORDER WAS CARRIED OUT, CHARGE NURSE IS AWARE, WILL CONTINUE TO MONITOR, ALL 
SAFETY MEASURES TAKEN

## 2018-06-17 NOTE — NUR
RN NOTE

NOTIFIED TAHIRA CLIFFORD NP REGARDING G-TUBE RESIDUAL OF 150CC, VENESSA HOFFMANN NP ORDERED TO STOP 
FEEDING FOR AN HOUR AND RECHECK. ADDITIONALLY NOTIFIED JOEL HOFFMANN REGARDING  ST ON 
THE MONITOR, PER TAHIRA NP CONTINUE TO MONITOR

## 2018-06-18 VITALS — SYSTOLIC BLOOD PRESSURE: 106 MMHG | DIASTOLIC BLOOD PRESSURE: 75 MMHG

## 2018-06-18 VITALS — SYSTOLIC BLOOD PRESSURE: 102 MMHG | DIASTOLIC BLOOD PRESSURE: 66 MMHG

## 2018-06-18 VITALS — DIASTOLIC BLOOD PRESSURE: 65 MMHG | SYSTOLIC BLOOD PRESSURE: 122 MMHG

## 2018-06-18 VITALS — SYSTOLIC BLOOD PRESSURE: 99 MMHG | DIASTOLIC BLOOD PRESSURE: 68 MMHG

## 2018-06-18 VITALS — DIASTOLIC BLOOD PRESSURE: 72 MMHG | SYSTOLIC BLOOD PRESSURE: 104 MMHG

## 2018-06-18 VITALS — DIASTOLIC BLOOD PRESSURE: 84 MMHG | SYSTOLIC BLOOD PRESSURE: 115 MMHG

## 2018-06-18 LAB
BUN SERPL-MCNC: 14 MG/DL (ref 7–18)
CALCIUM SERPL-MCNC: 7.9 MG/DL (ref 8.5–10.1)
CHLORIDE SERPL-SCNC: 105 MMOL/L (ref 98–107)
CO2 SERPL-SCNC: 25 MMOL/L (ref 21–32)
CREAT SERPL-MCNC: 1.2 MG/DL (ref 0.6–1.3)
GLUCOSE SERPL-MCNC: 323 MG/DL (ref 74–106)
HCT VFR BLD AUTO: 32 % (ref 39–51)
HCT VFR BLD AUTO: 34 % (ref 39–51)
HGB BLD-MCNC: 10.1 G/DL (ref 13.5–17.5)
HGB BLD-MCNC: 10.8 G/DL (ref 13.5–17.5)
LYMPHOCYTES NFR BLD MANUAL: 9 % (ref 16–48)
MCHC RBC AUTO-ENTMCNC: 32 G/DL (ref 31–36)
MCV RBC AUTO: 92 FL (ref 80–96)
MONOCYTES NFR BLD MANUAL: 1 % (ref 0–11)
NEUTS BAND NFR BLD MANUAL: 39 % (ref 0–5)
NEUTS SEG NFR BLD MANUAL: 51 % (ref 42–76)
PLATELET # BLD AUTO: 191 /CMM (ref 150–450)
POTASSIUM SERPL-SCNC: 3.7 MMOL/L (ref 3.5–5.1)
RBC # BLD AUTO: 3.65 MIL/UL (ref 4.5–6)
RDW COEFFICIENT OF VARIATION: 18.5 (ref 11.5–15)
SODIUM SERPL-SCNC: 139 MMOL/L (ref 136–145)
WBC NRBC COR # BLD AUTO: 18.3 K/UL (ref 4.3–11)

## 2018-06-18 RX ADMIN — Medication SCH EACH: at 23:47

## 2018-06-18 RX ADMIN — DEXTROSE MONOHYDRATE SCH MLS/HR: 50 INJECTION, SOLUTION INTRAVENOUS at 22:36

## 2018-06-18 RX ADMIN — Medication PRN MG: at 07:50

## 2018-06-18 RX ADMIN — INSULIN HUMAN PRN UNIT: 100 INJECTION, SOLUTION PARENTERAL at 06:04

## 2018-06-18 RX ADMIN — PIPERACILLIN SODIUM AND TAZOBACTAM SODIUM SCH MLS/HR: .375; 3 INJECTION, POWDER, LYOPHILIZED, FOR SOLUTION INTRAVENOUS at 17:13

## 2018-06-18 RX ADMIN — DEXTROSE MONOHYDRATE SCH MLS/HR: 50 INJECTION, SOLUTION INTRAVENOUS at 17:01

## 2018-06-18 RX ADMIN — PIPERACILLIN SODIUM AND TAZOBACTAM SODIUM SCH MLS/HR: .375; 3 INJECTION, POWDER, LYOPHILIZED, FOR SOLUTION INTRAVENOUS at 11:42

## 2018-06-18 RX ADMIN — SODIUM CHLORIDE PRN MLS/HR: 9 INJECTION, SOLUTION INTRAVENOUS at 10:51

## 2018-06-18 RX ADMIN — Medication SCH EACH: at 11:37

## 2018-06-18 RX ADMIN — INSULIN HUMAN PRN UNIT: 100 INJECTION, SOLUTION PARENTERAL at 23:50

## 2018-06-18 RX ADMIN — Medication SCH EACH: at 00:17

## 2018-06-18 RX ADMIN — PIPERACILLIN SODIUM AND TAZOBACTAM SODIUM SCH MLS/HR: .375; 3 INJECTION, POWDER, LYOPHILIZED, FOR SOLUTION INTRAVENOUS at 23:50

## 2018-06-18 RX ADMIN — PIPERACILLIN SODIUM AND TAZOBACTAM SODIUM SCH MLS/HR: .375; 3 INJECTION, POWDER, LYOPHILIZED, FOR SOLUTION INTRAVENOUS at 06:10

## 2018-06-18 RX ADMIN — ENOXAPARIN SODIUM SCH MG: 40 INJECTION SUBCUTANEOUS at 20:17

## 2018-06-18 RX ADMIN — ALBUTEROL SULFATE PRN MG: 2.5 SOLUTION RESPIRATORY (INHALATION) at 07:50

## 2018-06-18 RX ADMIN — INSULIN HUMAN PRN UNIT: 100 INJECTION, SOLUTION PARENTERAL at 11:44

## 2018-06-18 RX ADMIN — SODIUM CHLORIDE SCH MG: 9 INJECTION, SOLUTION INTRAVENOUS at 09:32

## 2018-06-18 RX ADMIN — Medication SCH EACH: at 06:03

## 2018-06-18 RX ADMIN — INSULIN HUMAN PRN UNIT: 100 INJECTION, SOLUTION PARENTERAL at 00:19

## 2018-06-18 RX ADMIN — SODIUM CHLORIDE SCH MG: 9 INJECTION, SOLUTION INTRAVENOUS at 17:03

## 2018-06-18 RX ADMIN — INSULIN HUMAN PRN UNIT: 100 INJECTION, SOLUTION PARENTERAL at 17:13

## 2018-06-18 RX ADMIN — Medication SCH EACH: at 17:11

## 2018-06-18 RX ADMIN — DEXTROSE MONOHYDRATE SCH MLS/HR: 50 INJECTION, SOLUTION INTRAVENOUS at 06:26

## 2018-06-18 NOTE — NUR
TD/RN     AM SHIFT INITIAL NOTES



RECEIVED PT ASLEEP IN BED, OPEN EYES, OBTUNDED, NO ACUTE CHANGE OF CONDITION OR RESPIRATORY 
DISTRESS BUT NOTED WITH ELEVATED TEMP 100.3, COOLING MEASURES ON GOING. VENT DEPENDENT WITH 
RATES SET AS PRESCRIBED, SATURATING @ 97%, LUNG SOUNDS CLEAR, SUCTIONED FOR AIRWAY 
CLEARANCE. ON TELE MONITORING, SINUS TACHY, . PT WITH ON GOING IV INFUSION OF NS @ 
75CC/HR, IV SITES FLUSHED, PATENT WITH NO S/S OF INFECTION.  GT FEEDING ON HOLD, AS REPORTED 
BY PM NURSE D/T COFFEE GROUNDS VOMIT LAST NIGHT. PELAYO CATHETER INTACT WITH CLOUDY YELLOW, 
SEDIMENTS URINE OUTPUT.  SCHEDULED AM MEDS TO BE GIVEN.  CL WITHIN REACHED AND SAFETY 
MAINTAINED.  ON GOING MONITORING.

## 2018-06-18 NOTE — NUR
RN NOTE

COFFEE GROUND EMESIS X 2, NOTIFIED TAHIRA CLIFFORD NP, PER TAHIRA CLIFFORD NP NEW ORDER OF PROTONIX 
40MG TWICE A DAY AND  ENDORSE TO AM SHIFT TO FOLLOW UP ON GI CONSULT AND CONTINUE TO 
MONITOR, ALSO NOTIFIED TAHIRA CLIFFORD NP THAT TEMPERATURE DROPPED FROM 101.6F .2F AFTER 
ADMINISTRATION OF TYLENOL SUPPOSITORY AND COOL MEASURES, NO NEW ORDERS GIVEN AT THIS

## 2018-06-18 NOTE — NUR
TD/RN     ROUNDS - DR. BURNHAM



UPDATED PT'S CONDITION. PT SEEN & EXAMINED BY DR. BURNHAM.  NO NEW ORDERS RECEIVED AT THIS 
TIME.

## 2018-06-18 NOTE — NUR
TD/RN     AM SHIFT END NOTES



NO VOMITING INCIDENT OR ACUTE CHANGE OF CONDITION DURING THE SHIFT, ALL NEEDS MET. PT STILL 
NPO, WITH ON GOING IV FLUIDS, IV SITES PATENT WITH NO S/S OF INFECTION, PELAYO CATHETER 
INTACT.  PT ENDORSED TO PM NURSE TO CONTINUE CARE.  CL WITHIN REACHED AND SAFETY MAINTAINED.

## 2018-06-18 NOTE — NUR
RN NOTE

RECEIVED PATIENT IN THE BED, OBTUNDED, NO RESPIRATORY DISTRESS NOTED, NO S/S OF PAIN OR 
DISCOMFORT NOTED, PER MD PUT LOVENOX ON HOLD, ALL SAFETY MEASURES TAKEN, WILL CONTINUE TO 
MONITOR PATIENT

## 2018-06-18 NOTE — NUR
RN NOTE

NO VOMITING AT THIS TIME, HOB ELEVATED AT ALL TIMES, SEIZURE PRECAUTIONS TAKEN, TURNED AND 
REPOSITIONED Q 2 HOURS, AFEBRILE AT THIS TIME, STILL MD ZAINA IS AWARE, ALL SAFETY MEASURES 
TAKEN, NO RESPIRATORY DISTRESS NOTED, TURNED AND REPOSITION Q 2 HOURS, WILL ENDORSE TO AM 
SHIFT FOR HONEY

## 2018-06-19 VITALS — SYSTOLIC BLOOD PRESSURE: 96 MMHG | DIASTOLIC BLOOD PRESSURE: 71 MMHG

## 2018-06-19 VITALS — DIASTOLIC BLOOD PRESSURE: 71 MMHG | SYSTOLIC BLOOD PRESSURE: 98 MMHG

## 2018-06-19 VITALS — DIASTOLIC BLOOD PRESSURE: 60 MMHG | SYSTOLIC BLOOD PRESSURE: 91 MMHG

## 2018-06-19 VITALS — DIASTOLIC BLOOD PRESSURE: 66 MMHG | SYSTOLIC BLOOD PRESSURE: 96 MMHG

## 2018-06-19 VITALS — DIASTOLIC BLOOD PRESSURE: 73 MMHG | SYSTOLIC BLOOD PRESSURE: 102 MMHG

## 2018-06-19 VITALS — SYSTOLIC BLOOD PRESSURE: 96 MMHG | DIASTOLIC BLOOD PRESSURE: 66 MMHG

## 2018-06-19 LAB
BASOPHILS # BLD AUTO: 0 /CMM (ref 0–0.2)
BASOPHILS NFR BLD AUTO: 0.3 % (ref 0–2)
BUN SERPL-MCNC: 11 MG/DL (ref 7–18)
CALCIUM SERPL-MCNC: 8 MG/DL (ref 8.5–10.1)
CHLORIDE SERPL-SCNC: 106 MMOL/L (ref 98–107)
CO2 SERPL-SCNC: 24 MMOL/L (ref 21–32)
CREAT SERPL-MCNC: 0.9 MG/DL (ref 0.6–1.3)
EOSINOPHIL NFR BLD AUTO: 4 % (ref 0–6)
GLUCOSE SERPL-MCNC: 288 MG/DL (ref 74–106)
HCT VFR BLD AUTO: 29 % (ref 39–51)
HGB BLD-MCNC: 9.5 G/DL (ref 13.5–17.5)
LYMPHOCYTES NFR BLD AUTO: 1.9 /CMM (ref 0.8–4.8)
LYMPHOCYTES NFR BLD AUTO: 12.3 % (ref 20–44)
MCHC RBC AUTO-ENTMCNC: 33 G/DL (ref 31–36)
MCV RBC AUTO: 91 FL (ref 80–96)
MONOCYTES NFR BLD AUTO: 0.5 /CMM (ref 0.1–1.3)
MONOCYTES NFR BLD AUTO: 3.3 % (ref 2–12)
NEUTROPHILS # BLD AUTO: 12.2 /CMM (ref 1.8–8.9)
NEUTROPHILS NFR BLD AUTO: 80.1 % (ref 43–81)
PLATELET # BLD AUTO: 188 /CMM (ref 150–450)
POTASSIUM SERPL-SCNC: 3.8 MMOL/L (ref 3.5–5.1)
RBC # BLD AUTO: 3.19 MIL/UL (ref 4.5–6)
RDW COEFFICIENT OF VARIATION: 18 (ref 11.5–15)
SODIUM SERPL-SCNC: 138 MMOL/L (ref 136–145)
WBC NRBC COR # BLD AUTO: 15.2 K/UL (ref 4.3–11)

## 2018-06-19 RX ADMIN — ACETYLCYSTEINE SCH MG: 100 SOLUTION ORAL; RESPIRATORY (INHALATION) at 20:13

## 2018-06-19 RX ADMIN — DEXTROSE MONOHYDRATE SCH MLS/HR: 50 INJECTION, SOLUTION INTRAVENOUS at 22:13

## 2018-06-19 RX ADMIN — PIPERACILLIN SODIUM AND TAZOBACTAM SODIUM SCH MLS/HR: .375; 3 INJECTION, POWDER, LYOPHILIZED, FOR SOLUTION INTRAVENOUS at 17:46

## 2018-06-19 RX ADMIN — INSULIN HUMAN PRN UNIT: 100 INJECTION, SOLUTION PARENTERAL at 06:49

## 2018-06-19 RX ADMIN — SODIUM CHLORIDE SCH MG: 9 INJECTION, SOLUTION INTRAVENOUS at 08:34

## 2018-06-19 RX ADMIN — INSULIN HUMAN PRN UNIT: 100 INJECTION, SOLUTION PARENTERAL at 18:01

## 2018-06-19 RX ADMIN — Medication SCH EACH: at 17:50

## 2018-06-19 RX ADMIN — INSULIN HUMAN PRN UNIT: 100 INJECTION, SOLUTION PARENTERAL at 12:50

## 2018-06-19 RX ADMIN — PIPERACILLIN SODIUM AND TAZOBACTAM SODIUM SCH MLS/HR: .375; 3 INJECTION, POWDER, LYOPHILIZED, FOR SOLUTION INTRAVENOUS at 12:19

## 2018-06-19 RX ADMIN — Medication SCH EACH: at 12:20

## 2018-06-19 RX ADMIN — DEXTROSE MONOHYDRATE SCH MLS/HR: 50 INJECTION, SOLUTION INTRAVENOUS at 14:14

## 2018-06-19 RX ADMIN — PIPERACILLIN SODIUM AND TAZOBACTAM SODIUM SCH MLS/HR: .375; 3 INJECTION, POWDER, LYOPHILIZED, FOR SOLUTION INTRAVENOUS at 05:55

## 2018-06-19 RX ADMIN — Medication SCH EACH: at 06:49

## 2018-06-19 RX ADMIN — DEXTROSE MONOHYDRATE SCH MLS/HR: 50 INJECTION, SOLUTION INTRAVENOUS at 05:56

## 2018-06-19 RX ADMIN — ENOXAPARIN SODIUM SCH MG: 40 INJECTION SUBCUTANEOUS at 21:00

## 2018-06-19 RX ADMIN — SODIUM CHLORIDE SCH MG: 9 INJECTION, SOLUTION INTRAVENOUS at 17:46

## 2018-06-19 RX ADMIN — SODIUM CHLORIDE PRN MLS/HR: 9 INJECTION, SOLUTION INTRAVENOUS at 06:50

## 2018-06-19 NOTE — NUR
LE RN CLOSING NOTE



PATIENT IN BED, OBTUNDED, WITH FACIAL TWITCHING NOTED, WITH PORTEX 8 TO MECHANICAL VENT, 
SETTINGS AS ORDERED. TOLERATING WELL. BREATHING EVEN AND UNLABORED. TELEMETRY READS SR HR 
68, NO SIGNS OF PAIN OR DISCOMFORT, RT HAND G18 WITH NS AT 75 ML/HR INFUSING WELL. RT 
FOREARM G22 FLUSHES WELL, BOTH SITES CLEAR. NPO DUE TO VOMITING AND RESIDUAL OF COFFEE 
GROUND MATERIAL LAST NIGHT.  PELAYO CATH IN PLACE WITH ADEQUATE AMOUNT OF BLOOD TINGED URINE 
OUTPUT, 475 ML. DR. CORBETT AWARE. PM CARE AND PRESCRIBED WOUND TREATMENT DONE, TURNED AND 
REPOSITIONED Q 2 HOURS. ALL SAFETY MEASURES IN PLACE. ALL NEEDS MET AT THIS TIME. MADE 
COMFORTABLE. NO OTHER SIGNIFICANT CHANGE IN CONDITION. WILL ENDORSE TO NEXT SHIFT FOR HONEY.

## 2018-06-19 NOTE — NUR
LE RN NOTES



NOTIFIED DR. MAYRA GARCIA ABOUT MED RECON LIST NOT DONE AND ALSO THAT PATIENT HAD 2 EPISODES OF 
VOMITING LAST NIGHT OF COFFEE GROUND MATERIAL.



DR. CORBETT AT BEDSIDE EARLIER. JUST ORDERED TO CHECK ON MAGNESIUM LEVEL AND WILL FOLLOW UP 
TOMORROW.



KEEP NPO.

## 2018-06-19 NOTE — NUR
RN NOTE

PATIENT RESTED WELL AT NIGHT, NO RESPIRATORY DISTRESS NOTED, SEIZURE PRECAUTIONS TAKEN, 
TURNED AND REPOSITION Q 2 HOURS, NO S/S OF BLEEDING NOTED, ENDORSED TO AM SHIFT TO FOLLOW UP 
ON GI CONSULT AND MED RECON PER TAHIRA CLIFFORD ORDER, WILL ENDORSE TO AM SHIFT FOR HONEY

## 2018-06-19 NOTE — NUR
FLAVIA RN NOTES



ZOSYN IV STARTED. VANCO IV TO FOLLOW.

-------------------------------------------------------------------------------

Addendum: 06/19/18 at 1549 by ALVA RAMÍREZ RN

-------------------------------------------------------------------------------

CORRECTION:

 DISREGARD THIS DOCUMENTATION INTENDED FOR ANOTHER PATIENT.

## 2018-06-19 NOTE — NUR
RN NOTE

NOTIFIED TAHIRA CLIFFORD NP THAT GI CONSULT WAS NOT DONE AND MED RECON WAS NOT DONE, PER TAHIRA CLIFFORD NP "ENDORSE TO AM SHIFT SO MORNING SHIFT NURSE CAN FOLLOW UP WITH PRIMARY MD", WILL 
NOTIFY MORNING NURSE TO FOLLOW UP

## 2018-06-19 NOTE — NUR
LE RN AM NOTE



RECEIVED PATIENT IN THE BED, OBTUNDED, WITH FACIAL TWITCHING NOTED, PER NIGHT SHIFT PT HAS 
BEEN LIKE THIS WHEN AWAKE, WITH PORTEX 8 TO MECHANICAL VENT, SETTINGS AS ORDERED. TOLERATING 
WELL. BREATHING EVEN AND UNLABORED. TELEMETRY READS SR HR 66, NO SIGNS OF PAIN OR 
DISCOMFORT, RT HAND G18 WITH NS AT 75 ML/HR INFUSING WELL. RT FOREARM G22 FLUSHES WELL, BOTH 
SITES CLEAR. NPO DUE TO VOMITING AND RESIDUAL OF COFFEE GROUND MATERIAL LAST NIGHT.  SEE 
NURSING FLOWSHEET FOR SKIN ISSUES. PELAYO CATH IN PLACE WITH ADEQUATE AMOUNT OF URINE OUTPUT. 
PER MD PUT LOVENOX ON HOLD, WILL TURN AND REPOSITION Q 2 HOURS. ALL SAFETY MEASURES IN 
PLACE. WILL CONTINUE TO MONITOR PATIENT

## 2018-06-19 NOTE — NUR
FLAVIA RAMÍREZ NOTES



TAYLOR. BS 90 MG/DL. NO INSULIN COVERAGE GIVEN.

-------------------------------------------------------------------------------

Addendum: 06/19/18 at 1549 by ALVA RAMÍREZ RN

-------------------------------------------------------------------------------

CORRECTION DISREGARD THIS DOCUMENTATION INTENDED FOR ANOTHER PATIENT.

## 2018-06-20 VITALS — SYSTOLIC BLOOD PRESSURE: 104 MMHG | DIASTOLIC BLOOD PRESSURE: 68 MMHG

## 2018-06-20 VITALS — DIASTOLIC BLOOD PRESSURE: 79 MMHG | SYSTOLIC BLOOD PRESSURE: 111 MMHG

## 2018-06-20 VITALS — DIASTOLIC BLOOD PRESSURE: 84 MMHG | SYSTOLIC BLOOD PRESSURE: 140 MMHG

## 2018-06-20 VITALS — SYSTOLIC BLOOD PRESSURE: 108 MMHG | DIASTOLIC BLOOD PRESSURE: 79 MMHG

## 2018-06-20 VITALS — DIASTOLIC BLOOD PRESSURE: 68 MMHG | SYSTOLIC BLOOD PRESSURE: 107 MMHG

## 2018-06-20 VITALS — SYSTOLIC BLOOD PRESSURE: 114 MMHG | DIASTOLIC BLOOD PRESSURE: 79 MMHG

## 2018-06-20 LAB
ALBUMIN SERPL BCP-MCNC: 2.2 G/DL (ref 3.4–5)
ALP SERPL-CCNC: 82 U/L (ref 46–116)
ALT SERPL W P-5'-P-CCNC: 27 U/L (ref 12–78)
AST SERPL W P-5'-P-CCNC: 29 U/L (ref 15–37)
BILIRUB DIRECT SERPL-MCNC: 0.1 MG/DL (ref 0–0.2)
BILIRUB SERPL-MCNC: 0.4 MG/DL (ref 0.2–1)
BUN SERPL-MCNC: 12 MG/DL (ref 7–18)
CALCIUM SERPL-MCNC: 8.4 MG/DL (ref 8.5–10.1)
CHLORIDE SERPL-SCNC: 107 MMOL/L (ref 98–107)
CO2 SERPL-SCNC: 26 MMOL/L (ref 21–32)
CREAT SERPL-MCNC: 1.2 MG/DL (ref 0.6–1.3)
FERRITIN SERPL-MCNC: 164 NG/ML (ref 8–388)
GLUCOSE SERPL-MCNC: 255 MG/DL (ref 74–106)
IRON SERPL-MCNC: 31 UG/DL (ref 50–175)
POTASSIUM SERPL-SCNC: 3.1 MMOL/L (ref 3.5–5.1)
PROT SERPL-MCNC: 7.3 G/DL (ref 6.4–8.2)
SODIUM SERPL-SCNC: 142 MMOL/L (ref 136–145)
TIBC SERPL-MCNC: 197 UG/DL (ref 250–450)

## 2018-06-20 RX ADMIN — INSULIN HUMAN PRN UNIT: 100 INJECTION, SOLUTION PARENTERAL at 00:23

## 2018-06-20 RX ADMIN — PIPERACILLIN SODIUM AND TAZOBACTAM SODIUM SCH MLS/HR: .375; 3 INJECTION, POWDER, LYOPHILIZED, FOR SOLUTION INTRAVENOUS at 06:20

## 2018-06-20 RX ADMIN — Medication SCH EACH: at 06:20

## 2018-06-20 RX ADMIN — Medication SCH EACH: at 00:24

## 2018-06-20 RX ADMIN — ACETYLCYSTEINE SCH MG: 100 SOLUTION ORAL; RESPIRATORY (INHALATION) at 10:00

## 2018-06-20 RX ADMIN — PIPERACILLIN SODIUM AND TAZOBACTAM SODIUM SCH MLS/HR: .375; 3 INJECTION, POWDER, LYOPHILIZED, FOR SOLUTION INTRAVENOUS at 17:51

## 2018-06-20 RX ADMIN — DEXTROSE MONOHYDRATE SCH MLS/HR: 50 INJECTION, SOLUTION INTRAVENOUS at 06:20

## 2018-06-20 RX ADMIN — PIPERACILLIN SODIUM AND TAZOBACTAM SODIUM SCH MLS/HR: .375; 3 INJECTION, POWDER, LYOPHILIZED, FOR SOLUTION INTRAVENOUS at 00:19

## 2018-06-20 RX ADMIN — PIPERACILLIN SODIUM AND TAZOBACTAM SODIUM SCH MLS/HR: .375; 3 INJECTION, POWDER, LYOPHILIZED, FOR SOLUTION INTRAVENOUS at 12:09

## 2018-06-20 RX ADMIN — POTASSIUM CHLORIDE SCH MLS/HR: 200 INJECTION, SOLUTION INTRAVENOUS at 14:13

## 2018-06-20 RX ADMIN — Medication SCH EACH: at 17:49

## 2018-06-20 RX ADMIN — SODIUM CHLORIDE PRN MLS/HR: 9 INJECTION, SOLUTION INTRAVENOUS at 21:31

## 2018-06-20 RX ADMIN — DEXTROSE MONOHYDRATE SCH MLS/HR: 50 INJECTION, SOLUTION INTRAVENOUS at 18:00

## 2018-06-20 RX ADMIN — DEXTROSE MONOHYDRATE SCH MLS/HR: 50 INJECTION, SOLUTION INTRAVENOUS at 14:00

## 2018-06-20 RX ADMIN — POTASSIUM CHLORIDE SCH MLS/HR: 200 INJECTION, SOLUTION INTRAVENOUS at 13:04

## 2018-06-20 RX ADMIN — INSULIN HUMAN PRN UNIT: 100 INJECTION, SOLUTION PARENTERAL at 12:56

## 2018-06-20 RX ADMIN — Medication SCH EACH: at 12:01

## 2018-06-20 RX ADMIN — Medication SCH EACH: at 23:44

## 2018-06-20 RX ADMIN — ACETYLCYSTEINE SCH MG: 100 SOLUTION ORAL; RESPIRATORY (INHALATION) at 16:59

## 2018-06-20 RX ADMIN — INSULIN HUMAN PRN UNIT: 100 INJECTION, SOLUTION PARENTERAL at 18:07

## 2018-06-20 RX ADMIN — SODIUM CHLORIDE SCH MG: 9 INJECTION, SOLUTION INTRAVENOUS at 09:40

## 2018-06-20 RX ADMIN — SODIUM CHLORIDE SCH MG: 9 INJECTION, SOLUTION INTRAVENOUS at 16:59

## 2018-06-20 RX ADMIN — SODIUM CHLORIDE PRN MLS/HR: 9 INJECTION, SOLUTION INTRAVENOUS at 00:24

## 2018-06-20 RX ADMIN — POTASSIUM CHLORIDE SCH MLS/HR: 200 INJECTION, SOLUTION INTRAVENOUS at 15:18

## 2018-06-20 RX ADMIN — INSULIN HUMAN PRN UNIT: 100 INJECTION, SOLUTION PARENTERAL at 23:51

## 2018-06-20 RX ADMIN — PIPERACILLIN SODIUM AND TAZOBACTAM SODIUM SCH MLS/HR: .375; 3 INJECTION, POWDER, LYOPHILIZED, FOR SOLUTION INTRAVENOUS at 23:44

## 2018-06-20 RX ADMIN — POTASSIUM CHLORIDE SCH MLS/HR: 200 INJECTION, SOLUTION INTRAVENOUS at 12:08

## 2018-06-20 RX ADMIN — INSULIN HUMAN PRN UNIT: 100 INJECTION, SOLUTION PARENTERAL at 06:20

## 2018-06-20 NOTE — NUR
RT NOTE



LATE ENTRY: @ 0905 PT TRANSFERRED TO CT VIA AMBU BAG WITH NO COMPLICATIONS. DESIREE ARECHIGA.

## 2018-06-20 NOTE — NUR
RT NOTE



LATE ENTRY: @ 1020 PT TRANSFERRED FROM CT TO LE VIA AMBU BAG WITH NO COMPLICATIONS. DESIREE ARECHIGA.

## 2018-06-20 NOTE — NUR
RN CLOSING NOTES



PATIENT WITH NO ACUTE DISTRESS OBSERVED OVERNIGHT. NO FURTHER COFFEE GROUND EMESIS. NO 
GASTRIC RESIDUAL. PATIENT KEPT NPO. 

LOVENOX HELD FOR NOTED MARKED DROP IN HGB FROM 14.5 TO 10.1. CHARGE NURSE AWARE. WILL 
ENDORSE FOR F/UP AND CLARIFICATION. WOUND TREATMENT RENDERED. F/C STILL NOTED WITH BLOOD 
TINGED URINE WITH SEDIMENTS, SMALL CLOTS. AFEBRILE. IVF AS ORDERED, ON ATB. SAFETY AND 
COMFORT ENSURED. BED IN LOW AND LOCKED POSITION. WILL ENDORSE ACCORDINGLY FOR CONTINUITY OF 
CARE.

## 2018-06-20 NOTE — NUR
PT LALY'D ON MECHANICAL VENT. SX DONE T/O SHIFT. PT LALY PATENT AND SECURE. AMBU BAG AT 
BEDSIDE. VENT PLUGGED INTO RED OUTLET. ALARMS ARE SET AND AUDIBLE. WILL CONTINUE TO MONITOR.

-------------------------------------------------------------------------------

Addendum: 06/20/18 at 1346 by ROSETTE DONIS RT

-------------------------------------------------------------------------------

Amended: Links added.

## 2018-06-20 NOTE — NUR
LE RN NOTE: 



RECEIVED PT ON BED, OBTUNDED. NO ACUTE DISTRESS NOTED. BREATHING EVEN AND UNLABORED WITH 
NORMAL RESPIRATIONS. [facility], SETTING AS ORDERED. NO FACIAL GRIMACING OR ANY 
SIGNS OF PAIN NOTED. TELEMETRY READING SR HR 60. IV ON RIGHT HAND G18 AND RIGHT FOREARM G22, 
INTACT AND PATENT, WITH IV NS @ 75 ML/HR, INFUSING WELL. GTUBE FEEDING STILL ON HOLD DUE TO 
EPISODES OF COFFEE GROUND EMESIS WITH RESIDUAL. PELAYO CATH INTACT AND PATENT WITH BLOOD 
TINGED URINE DRAINING, MD AWARE. TURNED AND REPOSITIONED Q2HRS. KEPT CLEAN, DRY AND 
COMFORTABLE. SAFETY AND FALL PRECAUTIONS IMPLEMENTED. WILL CONTINUE TO MONITOR.

## 2018-06-20 NOTE — NUR
RN LE NOTE 



RECEIVED PATIENT WITH HOB ELEVATED OBTUNDED, OPENS EYES, TRACH TO MECHANICAL VENT ON 
SETTINGS AS ORDERED, NO S/SX OF RESPIRATORY OR CARDIAC DISTRESS NOTED, TELE SR 61, BLE/BUE 
NON PITTING EDEMA, G TUBE CLAMPED, NPO, F/C DRAINING TO GRAVITY PINK COLORED URINE, RFA #22G 
AND R HAND #18G PATENT FLUSHING WELL, SITES CDI WITH NS AT 75ML/HR. SKIN KEPT CLEAN AND DRY. 
SAFETY MAINTAINED AT ALL TIMES BED IN LOW LOCKED POSITION HOB ELEVATED CALL LIGHT WITHIN 
REACH, WILL CONTINUE TO MONITOR FOR ANY CHANGES IN CONDITION.

## 2018-06-20 NOTE — NUR
WOUND CARE CONSULT: PT NOT SEEN DUE TO PT OFF UNIT AT THIS TIME. DEFER TO PODIATRY FOR RT 
HEEL WOUND. ALL SKIN PROTECTION RECOMMENDATIONS IN PLACE. WILL SEE PT AS PT CONDITION 
PERMITS.

## 2018-06-20 NOTE — NUR
LE RN NOTES:



NO APPARENT DISTRESS NOTED. NO SOB. NO SIGNS/SYMPTOMS OF PAIN OR DISCOMFORT AT THIS TIME. 
TURNED AND REPOSITIONED. WOUND TREATMENT DONE AS ORDERED. SAFETY AND FALL PRECAUTIONS 
OBSERVED AND MAINTAINED. ALL NEEDS ATTENDED. WILL ENDORSE TO PM NURSE FOR CONTINUITY OF 
CARE.

## 2018-06-21 VITALS — DIASTOLIC BLOOD PRESSURE: 78 MMHG | SYSTOLIC BLOOD PRESSURE: 113 MMHG

## 2018-06-21 VITALS — DIASTOLIC BLOOD PRESSURE: 64 MMHG | SYSTOLIC BLOOD PRESSURE: 102 MMHG

## 2018-06-21 VITALS — SYSTOLIC BLOOD PRESSURE: 113 MMHG | DIASTOLIC BLOOD PRESSURE: 76 MMHG

## 2018-06-21 VITALS — SYSTOLIC BLOOD PRESSURE: 100 MMHG | DIASTOLIC BLOOD PRESSURE: 73 MMHG

## 2018-06-21 VITALS — DIASTOLIC BLOOD PRESSURE: 76 MMHG | SYSTOLIC BLOOD PRESSURE: 114 MMHG

## 2018-06-21 VITALS — SYSTOLIC BLOOD PRESSURE: 116 MMHG | DIASTOLIC BLOOD PRESSURE: 76 MMHG

## 2018-06-21 LAB
AFP-TM SERPL-MCNC: 2.3 NG/ML (ref 0–8.3)
APPEARANCE UR: (no result)
BASOPHILS # BLD AUTO: 0 /CMM (ref 0–0.2)
BASOPHILS NFR BLD AUTO: 0.1 % (ref 0–2)
BILIRUB UR QL STRIP: NEGATIVE
BUN SERPL-MCNC: 14 MG/DL (ref 7–18)
CALCIUM SERPL-MCNC: 8.4 MG/DL (ref 8.5–10.1)
CANCER AG19-9 SERPL-ACNC: 1 U/ML (ref 0–35)
CHLORIDE SERPL-SCNC: 110 MMOL/L (ref 98–107)
CO2 SERPL-SCNC: 20 MMOL/L (ref 21–32)
COLOR UR: YELLOW
CREAT SERPL-MCNC: 1.9 MG/DL (ref 0.6–1.3)
CREAT UR-MCNC: 75.6 MG/DL (ref 30–125)
EOSINOPHIL NFR BLD AUTO: 4.9 % (ref 0–6)
EOSINOPHIL NFR BLD MANUAL: 9 % (ref 0–4)
GLUCOSE SERPL-MCNC: 227 MG/DL (ref 74–106)
GLUCOSE UR STRIP-MCNC: NEGATIVE MG/DL
HCT VFR BLD AUTO: 30 % (ref 39–51)
HGB BLD-MCNC: 9.8 G/DL (ref 13.5–17.5)
HGB UR QL STRIP: (no result) ERY/UL
KETONES UR STRIP-MCNC: NEGATIVE MG/DL
LEUKOCYTE ESTERASE UR QL STRIP: NEGATIVE
LYMPHOCYTES NFR BLD AUTO: 1.6 /CMM (ref 0.8–4.8)
LYMPHOCYTES NFR BLD AUTO: 12.9 % (ref 20–44)
LYMPHOCYTES NFR BLD MANUAL: 16 % (ref 16–48)
MAGNESIUM SERPL-MCNC: 2.4 MG/DL (ref 1.8–2.4)
MCHC RBC AUTO-ENTMCNC: 33 G/DL (ref 31–36)
MCV RBC AUTO: 90 FL (ref 80–96)
METAMYELOCYTES NFR BLD MANUAL: 1 % (ref 0–0)
MONOCYTES NFR BLD AUTO: 1 /CMM (ref 0.1–1.3)
MONOCYTES NFR BLD AUTO: 7.8 % (ref 2–12)
MONOCYTES NFR BLD MANUAL: 9 % (ref 0–11)
NEUTROPHILS # BLD AUTO: 9.4 /CMM (ref 1.8–8.9)
NEUTROPHILS NFR BLD AUTO: 74.3 % (ref 43–81)
NEUTS BAND NFR BLD MANUAL: 2 % (ref 0–5)
NEUTS SEG NFR BLD MANUAL: 63 % (ref 42–76)
NITRITE UR QL STRIP: NEGATIVE
PH UR STRIP: 6 [PH] (ref 5–8)
PHOSPHATE SERPL-MCNC: 2.8 MG/DL (ref 2.5–4.9)
PLATELET # BLD AUTO: 225 /CMM (ref 150–450)
POTASSIUM SERPL-SCNC: 3.7 MMOL/L (ref 3.5–5.1)
PROT UR QL STRIP: (no result) MG/DL
PROT UR-MCNC: 140.5 MG/DL (ref 0–11.9)
RBC # BLD AUTO: 3.29 MIL/UL (ref 4.5–6)
RBC #/AREA URNS HPF: (no result) /HPF (ref 0–2)
RDW COEFFICIENT OF VARIATION: 18.3 (ref 11.5–15)
SODIUM SERPL-SCNC: 143 MMOL/L (ref 136–145)
SODIUM UR-SCNC: 32 MMOL/L (ref 40–220)
UROBILINOGEN UR STRIP-MCNC: 0.2 EU/DL
WBC #/AREA URNS HPF: (no result) /HPF (ref 0–3)
WBC NRBC COR # BLD AUTO: 12.7 K/UL (ref 4.3–11)

## 2018-06-21 RX ADMIN — ACETYLCYSTEINE SCH MG: 100 SOLUTION ORAL; RESPIRATORY (INHALATION) at 08:20

## 2018-06-21 RX ADMIN — DEXTROSE MONOHYDRATE SCH MLS/HR: 50 INJECTION, SOLUTION INTRAVENOUS at 06:48

## 2018-06-21 RX ADMIN — SODIUM CHLORIDE SCH MG: 9 INJECTION, SOLUTION INTRAVENOUS at 16:55

## 2018-06-21 RX ADMIN — PIPERACILLIN SODIUM AND TAZOBACTAM SODIUM SCH MLS/HR: .25; 2 INJECTION, POWDER, LYOPHILIZED, FOR SOLUTION INTRAVENOUS at 17:22

## 2018-06-21 RX ADMIN — SODIUM CHLORIDE PRN MLS/HR: 9 INJECTION, SOLUTION INTRAVENOUS at 14:33

## 2018-06-21 RX ADMIN — Medication SCH EACH: at 23:33

## 2018-06-21 RX ADMIN — INSULIN HUMAN PRN UNIT: 100 INJECTION, SOLUTION PARENTERAL at 05:23

## 2018-06-21 RX ADMIN — INSULIN HUMAN PRN UNIT: 100 INJECTION, SOLUTION PARENTERAL at 18:01

## 2018-06-21 RX ADMIN — PIPERACILLIN SODIUM AND TAZOBACTAM SODIUM SCH MLS/HR: .25; 2 INJECTION, POWDER, LYOPHILIZED, FOR SOLUTION INTRAVENOUS at 12:13

## 2018-06-21 RX ADMIN — INSULIN HUMAN PRN UNIT: 100 INJECTION, SOLUTION PARENTERAL at 23:36

## 2018-06-21 RX ADMIN — Medication SCH EACH: at 12:13

## 2018-06-21 RX ADMIN — ACETYLCYSTEINE SCH MG: 100 SOLUTION ORAL; RESPIRATORY (INHALATION) at 16:55

## 2018-06-21 RX ADMIN — PIPERACILLIN SODIUM AND TAZOBACTAM SODIUM SCH MLS/HR: .375; 3 INJECTION, POWDER, LYOPHILIZED, FOR SOLUTION INTRAVENOUS at 05:13

## 2018-06-21 RX ADMIN — Medication SCH EACH: at 17:59

## 2018-06-21 RX ADMIN — SODIUM CHLORIDE SCH MG: 9 INJECTION, SOLUTION INTRAVENOUS at 08:20

## 2018-06-21 RX ADMIN — INSULIN HUMAN PRN UNIT: 100 INJECTION, SOLUTION PARENTERAL at 12:26

## 2018-06-21 RX ADMIN — Medication SCH EACH: at 05:13

## 2018-06-21 NOTE — NUR
RT NOTE



PT RECEIVED TRACHED AND ON A VENT W NOTED SETTINGS. VENT ALARMS ARE ON AND AUDIBLE AND 
PLUGGED INTO RED OUTLET W AMBUBAG AT BEDSIDE. MODERATE AMTS OF THICK YELLOW/WHITE SECRETIONS 
SXD. NO DISTRESS NOTED AT THIS TIME WILL CONTINUE TO MONITOR AND PASS REPORT TO NIGHT SHIFT. 

-------------------------------------------------------------------------------

Addendum: 06/21/18 at 0832 by RENEE CANALES RT

-------------------------------------------------------------------------------

Amended: Links added.

## 2018-06-21 NOTE — NUR
RT NOTES



PT REC'D TRACHED PORTEX 9 VIA [facility] VENT SETTINGS AS CHARTED. NO RESP DISTRESS NOTED. NO 
CHANGES MADE THROUGHOUT THE SHIFT. TRACH IS MIDLINE AND IN PLACE. SX'D THICK YELLOW MOD AMT 
OF SECRETIONS. ALARMS ARE SET AND AUDIBLE. VENT PLUGGED INTO RED OUTLET. AMBU BAG BEDSIDE. 
WILL CONTINUE TO MONITOR.

-------------------------------------------------------------------------------

Addendum: 06/21/18 at 0627 by MARIO BALDWIN RT

-------------------------------------------------------------------------------

Amended: Links added.

## 2018-06-21 NOTE — NUR
TELE RN OPENING NOTES

RECEIVED REPORT FROM VERN RAMÍREZ. PATIENT OBTUNDED BUT OPENS EYES SPONTANEOUSLY. BREATHING EVEN 
& UNLABORED W/ TRACH INTACT & TOLERATING VENT SETTINGS AC 12, , FIO2 40%, PEEP 5. 
SATING @ 100%. ON TELE W/ SINUS JEANNIE, HR 57. NO RESPIRATORY OR CARDIAC DISTRESS NOTED. 
RIGHT HAND IV #18 & RIGHT FOREARM IV #22 INTACT & PATENT W/ DRESSING CDI, SALINE LOCKED. 
G-TUBE FLUSHING WELL W/ NO GTF @ THIS TIME D/T NPO STATUS. PELAYO CATH DRAINING YELLOW URINE, 
NO HEMATURIA NOTED. NO S/S OF PAIN OR DISCOMFORT @ THIS TIME. SAFETY MEASURES IN PLACE W/ 
BED ALARM ON & HOB ELEVATED FOR ASPIRATION PRECAUTIONS. WILL CONTINUE TO MONITOR.

## 2018-06-21 NOTE — NUR
RN NOTE 



RECEIVED PATIENT WITH HOB ELEVATED, PATIENT IS OBTUNDED, BUT IS ABLE TO OPENS EYES WITH 
TACTILE STIMULI, TRACH/VENT DEPENDENT ON APPROPRIATE SETTINGS AS ORDERED, NO S/SX OF 
RESPIRATORY DISTRESS. ON CARDIAC MONITOR SINUS RHYTHM HR OF 61. GT SITE INTACT AN PATENT, 
CLAMPED, AND NPO. F/C INTACT AND PATENT WITH ADEQUATE FLOW OF URINE. RIGHT FA  AND RIGHT 
HAND INTACT AND PATENT. ALL SAFETY MEASURES DONE. BED LOW AND LOCKED POSITION PLACED CALL 
LIGHT WITHIN REACH, WILL CONTINUE TO MONITOR

## 2018-06-21 NOTE — NUR
RN NOTE



PATIENT REMAINED STABLE THROUGHOUT SHIFT. NO ACUTE CHANGES OR DISTRESS NOTED. WILL ENDORSE 
TO NEXT SHIFT TO CONTINUE CONTINUITY OF CARE.

## 2018-06-22 VITALS — SYSTOLIC BLOOD PRESSURE: 119 MMHG | DIASTOLIC BLOOD PRESSURE: 79 MMHG

## 2018-06-22 VITALS — DIASTOLIC BLOOD PRESSURE: 78 MMHG | SYSTOLIC BLOOD PRESSURE: 124 MMHG

## 2018-06-22 VITALS — DIASTOLIC BLOOD PRESSURE: 87 MMHG | SYSTOLIC BLOOD PRESSURE: 128 MMHG

## 2018-06-22 VITALS — DIASTOLIC BLOOD PRESSURE: 79 MMHG | SYSTOLIC BLOOD PRESSURE: 119 MMHG

## 2018-06-22 VITALS — SYSTOLIC BLOOD PRESSURE: 109 MMHG | DIASTOLIC BLOOD PRESSURE: 67 MMHG

## 2018-06-22 VITALS — SYSTOLIC BLOOD PRESSURE: 110 MMHG | DIASTOLIC BLOOD PRESSURE: 72 MMHG

## 2018-06-22 VITALS — DIASTOLIC BLOOD PRESSURE: 66 MMHG | SYSTOLIC BLOOD PRESSURE: 104 MMHG

## 2018-06-22 LAB
ALBUMIN SERPL BCP-MCNC: 2 G/DL (ref 3.4–5)
ALBUMIN SERPL ELPH-MCNC: 2.2 G/DL (ref 2.9–4.4)
ALBUMIN/GLOB SERPL: 0.6 {RATIO} (ref 0.7–1.7)
ALP SERPL-CCNC: 57 U/L (ref 46–116)
ALPHA1 GLOB SERPL ELPH-MCNC: 0.3 G/DL (ref 0–0.4)
ALPHA2 GLOB SERPL ELPH-MCNC: 1.1 G/DL (ref 0.4–1)
ALT SERPL W P-5'-P-CCNC: 23 U/L (ref 12–78)
AST SERPL W P-5'-P-CCNC: 31 U/L (ref 15–37)
B-GLOBULIN SERPL ELPH-MCNC: 0.9 G/DL (ref 0.7–1.3)
BASOPHILS # BLD AUTO: 0 /CMM (ref 0–0.2)
BASOPHILS NFR BLD AUTO: 0 % (ref 0–2)
BILIRUB SERPL-MCNC: 0.2 MG/DL (ref 0.2–1)
BUN SERPL-MCNC: 14 MG/DL (ref 7–18)
CALCIUM SERPL-MCNC: 7.8 MG/DL (ref 8.5–10.1)
CHLORIDE SERPL-SCNC: 112 MMOL/L (ref 98–107)
CK SERPL-CCNC: 119 U/L (ref 39–308)
CO2 SERPL-SCNC: 22 MMOL/L (ref 21–32)
CREAT SERPL-MCNC: 2 MG/DL (ref 0.6–1.3)
EOSINOPHIL NFR BLD AUTO: 4.2 % (ref 0–6)
GAMMA GLOB SERPL ELPH-MCNC: 1.1 G/DL (ref 0.4–1.8)
GLOBULIN SER CALC-MCNC: 3.5 G/DL (ref 2.2–3.9)
GLUCOSE SERPL-MCNC: 194 MG/DL (ref 74–106)
HCT VFR BLD AUTO: 31 % (ref 39–51)
HEMOCCULT STL QL: NEGATIVE
HGB BLD-MCNC: 10.3 G/DL (ref 13.5–17.5)
IGA SERPL-MCNC: 187 MG/DL (ref 90–386)
IGM SERPL-MCNC: 64 MG/DL (ref 20–172)
LYMPHOCYTES NFR BLD AUTO: 1.8 /CMM (ref 0.8–4.8)
LYMPHOCYTES NFR BLD AUTO: 13.4 % (ref 20–44)
MAGNESIUM SERPL-MCNC: 2.3 MG/DL (ref 1.8–2.4)
MCHC RBC AUTO-ENTMCNC: 33 G/DL (ref 31–36)
MCV RBC AUTO: 91 FL (ref 80–96)
MONOCYTES NFR BLD AUTO: 0.1 /CMM (ref 0.1–1.3)
MONOCYTES NFR BLD AUTO: 0.8 % (ref 2–12)
NEUTROPHILS # BLD AUTO: 10.8 /CMM (ref 1.8–8.9)
NEUTROPHILS NFR BLD AUTO: 81.6 % (ref 43–81)
PHOSPHATE SERPL-MCNC: 3.4 MG/DL (ref 2.5–4.9)
PLATELET # BLD AUTO: 230 /CMM (ref 150–450)
POTASSIUM SERPL-SCNC: 3.5 MMOL/L (ref 3.5–5.1)
PROT SERPL-MCNC: 6.7 G/DL (ref 6.4–8.2)
RBC # BLD AUTO: 3.42 MIL/UL (ref 4.5–6)
RDW COEFFICIENT OF VARIATION: 18.4 (ref 11.5–15)
SODIUM SERPL-SCNC: 145 MMOL/L (ref 136–145)
VANCOMYCIN TROUGH SERPL-MCNC: 16 UG/ML (ref 12–20)
WBC NRBC COR # BLD AUTO: 13.2 K/UL (ref 4.3–11)

## 2018-06-22 RX ADMIN — ACETYLCYSTEINE SCH MG: 100 SOLUTION ORAL; RESPIRATORY (INHALATION) at 09:00

## 2018-06-22 RX ADMIN — INSULIN HUMAN PRN UNIT: 100 INJECTION, SOLUTION PARENTERAL at 17:27

## 2018-06-22 RX ADMIN — SODIUM CHLORIDE PRN MLS/HR: 9 INJECTION, SOLUTION INTRAVENOUS at 05:17

## 2018-06-22 RX ADMIN — Medication SCH EACH: at 11:44

## 2018-06-22 RX ADMIN — INSULIN HUMAN PRN UNIT: 100 INJECTION, SOLUTION PARENTERAL at 05:26

## 2018-06-22 RX ADMIN — SODIUM CHLORIDE SCH MG: 9 INJECTION, SOLUTION INTRAVENOUS at 08:19

## 2018-06-22 RX ADMIN — Medication SCH EACH: at 05:17

## 2018-06-22 RX ADMIN — INSULIN HUMAN PRN UNIT: 100 INJECTION, SOLUTION PARENTERAL at 12:19

## 2018-06-22 RX ADMIN — Medication SCH EACH: at 17:24

## 2018-06-22 RX ADMIN — CEFEPIME HYDROCHLORIDE SCH MLS/HR: 1 INJECTION, POWDER, FOR SOLUTION INTRAMUSCULAR; INTRAVENOUS at 17:06

## 2018-06-22 RX ADMIN — SODIUM CHLORIDE SCH MG: 9 INJECTION, SOLUTION INTRAVENOUS at 17:05

## 2018-06-22 RX ADMIN — ACETYLCYSTEINE SCH MG: 100 SOLUTION ORAL; RESPIRATORY (INHALATION) at 17:39

## 2018-06-22 NOTE — NUR
RN NOTES



PT REMAINED IN STABLE CONDITION. TOLERATING CURRENT VENT SETTINGS. NO SIGNS OF PAIN. KEPT ON 
NPO. NO HEMATURIA NOTED. STOOL SPECIMEN SENT TO LAB FOR OB. IV LINES KEPT INTACT AND PATENT. 
FC KEPT IN PLACED. SAFETY MEASURES MAINTAINED. ASPIRATION PRECAUTION OBSERVED AT ALL TIMES. 
ALL NEEDS ATTENDED. ENDORSED TO PM SHIFT NURSE FOR HONEY

## 2018-06-22 NOTE — NUR
TELE/RN INITIAL NOTES



RECEIVED PT IN BED IN SEMI FOWLERS POSITION. WITH TRACH, TOLERATING WELL VENT SETTINGS: AC 
12, , FI02 40%, PEEP 5. SATURATING WELL. SINUS JEANNIE ON TELE MONITOR. NO SIGNS OF PAIN 
AT THIS TIME. WITH ONGOING IVF OF NS AT 75 ML/HR INFUSING WELL ON LISA. RH SL INTACT. GTUBE 
IN PLACED. NPO AT THIS TIME. FC IN PLACED DRAINING YELLOW URINE. NO HEMATURIA NOTED. 
ASPIRATION PRECAUTION OBSERVED. SAFETY MEASURES OBSERVED. CALL LIGHT WITHIN REACH. WILL CONT 
TO MONITOR



.

## 2018-06-22 NOTE — NUR
RT NOTE



PT RECEIVED TRACHED AND ON A VENT W NOTED SETTINGS. VENT ALARMS ARE ON AND AUDIBLE AND 
PLUGGED INTO RED OUTLET W AMBUBAG AT BEDSIDE. MODERATE AMTS OF THICK YELLOW/WHITE SECRETIONS 
SXD. NO DISTRESS NOTED AT THIS TIME WILL CONTINUE TO MONITOR.

## 2018-06-22 NOTE — NUR
RT NOTES



PT REC'D TRACHED PORTEX 9 VIA [facility] VENT SETTINGS AS CHARTED. NO RESP DISTRESS NOTED. NO 
CHANGES MADE THROUGHOUT THE SHIFT. TRACH IS MIDLINE AND IN PLACE. SX'D THICK YELLOW MOD AMT 
OF SECRETIONS. ALARMS ARE SET AND AUDIBLE. VENT PLUGGED INTO RED OUTLET. AMBU BAG BEDSIDE. 
WILL CONTINUE TO MONITOR.

-------------------------------------------------------------------------------

Addendum: 06/22/18 at 0448 by MARIO BALDWIN RT

-------------------------------------------------------------------------------

Amended: Links added.

## 2018-06-22 NOTE — NUR
RT

PATIENT REC'D TRACHED ON [facility] VENT WITH ORDERED SETTINGS TRISTAN WELL. VENT ALARMS CHECKED + 
AUDIBLE. CUFF PRESSURE CHECKED MLT. PATIENT NON RESPONSIVE TO VERBAL COMMANDS, NO SOB NOTED. 
PATIENT SUCTIONED WITH MOD AMT OF PALE SEMITHICK SECRETIONS. B/S CLIFF. AMBU BAG AT HOB

-------------------------------------------------------------------------------

Addendum: 06/22/18 at 2227 by ABDULLAHI DE PAZ RT

-------------------------------------------------------------------------------

Amended: Links added.

## 2018-06-22 NOTE — NUR
RECEIVED PTS IN  BED  REMAINS ON  MECHANICAL  VENTILATOR HOB  ELEVATED   FOR ASPIRATION  
PRECAUTION , PTS EYE OPENING  BUT NON  VERBAL , REMAINS  NPO  ,SPOKE TO  NP TOVA NOEL IV 
FLUIDS WITH ORDER NOTED AND  CARRIED OUT , V/S STABLE AFEBRILE , NO SOB NO DISTRESS NOTED  
DUE  MEDS  GIVEN AS ORDERED  ALL NEEDS ATTENDED TOO  KEPT PTS CLEAN DRY AND  COMFORTABLE

## 2018-06-23 VITALS — SYSTOLIC BLOOD PRESSURE: 119 MMHG | DIASTOLIC BLOOD PRESSURE: 83 MMHG

## 2018-06-23 VITALS — DIASTOLIC BLOOD PRESSURE: 81 MMHG | SYSTOLIC BLOOD PRESSURE: 115 MMHG

## 2018-06-23 VITALS — DIASTOLIC BLOOD PRESSURE: 73 MMHG | SYSTOLIC BLOOD PRESSURE: 130 MMHG

## 2018-06-23 VITALS — SYSTOLIC BLOOD PRESSURE: 132 MMHG | DIASTOLIC BLOOD PRESSURE: 77 MMHG

## 2018-06-23 VITALS — SYSTOLIC BLOOD PRESSURE: 133 MMHG | DIASTOLIC BLOOD PRESSURE: 83 MMHG

## 2018-06-23 VITALS — SYSTOLIC BLOOD PRESSURE: 137 MMHG | DIASTOLIC BLOOD PRESSURE: 84 MMHG

## 2018-06-23 LAB
BASOPHILS # BLD AUTO: 0.1 /CMM (ref 0–0.2)
BASOPHILS NFR BLD AUTO: 1.1 % (ref 0–2)
BUN SERPL-MCNC: 13 MG/DL (ref 7–18)
CALCIUM SERPL-MCNC: 8.6 MG/DL (ref 8.5–10.1)
CHLORIDE SERPL-SCNC: 112 MMOL/L (ref 98–107)
CO2 SERPL-SCNC: 22 MMOL/L (ref 21–32)
CREAT SERPL-MCNC: 1.9 MG/DL (ref 0.6–1.3)
EOSINOPHIL NFR BLD AUTO: 3.7 % (ref 0–6)
GLUCOSE SERPL-MCNC: 242 MG/DL (ref 74–106)
HCT VFR BLD AUTO: 32 % (ref 39–51)
HGB BLD-MCNC: 10.5 G/DL (ref 13.5–17.5)
LYMPHOCYTES NFR BLD AUTO: 15.7 % (ref 20–44)
LYMPHOCYTES NFR BLD AUTO: 2 /CMM (ref 0.8–4.8)
MAGNESIUM SERPL-MCNC: 2.2 MG/DL (ref 1.8–2.4)
MCHC RBC AUTO-ENTMCNC: 33 G/DL (ref 31–36)
MCV RBC AUTO: 91 FL (ref 80–96)
MONOCYTES NFR BLD AUTO: 0.7 /CMM (ref 0.1–1.3)
MONOCYTES NFR BLD AUTO: 5.8 % (ref 2–12)
NEUTROPHILS # BLD AUTO: 9.5 /CMM (ref 1.8–8.9)
NEUTROPHILS NFR BLD AUTO: 73.7 % (ref 43–81)
PHOSPHATE SERPL-MCNC: 3.6 MG/DL (ref 2.5–4.9)
PLATELET # BLD AUTO: 303 /CMM (ref 150–450)
POTASSIUM SERPL-SCNC: 3.5 MMOL/L (ref 3.5–5.1)
RBC # BLD AUTO: 3.53 MIL/UL (ref 4.5–6)
RDW COEFFICIENT OF VARIATION: 18.4 (ref 11.5–15)
SODIUM SERPL-SCNC: 144 MMOL/L (ref 136–145)
WBC NRBC COR # BLD AUTO: 12.9 K/UL (ref 4.3–11)

## 2018-06-23 RX ADMIN — Medication SCH EACH: at 12:30

## 2018-06-23 RX ADMIN — DEXTROSE AND SODIUM CHLORIDE PRN MLS/HR: 5; 450 INJECTION, SOLUTION INTRAVENOUS at 00:44

## 2018-06-23 RX ADMIN — Medication SCH EACH: at 23:37

## 2018-06-23 RX ADMIN — CEFEPIME HYDROCHLORIDE SCH MLS/HR: 1 INJECTION, POWDER, FOR SOLUTION INTRAMUSCULAR; INTRAVENOUS at 17:43

## 2018-06-23 RX ADMIN — INSULIN HUMAN PRN UNIT: 100 INJECTION, SOLUTION PARENTERAL at 12:32

## 2018-06-23 RX ADMIN — Medication SCH EACH: at 06:03

## 2018-06-23 RX ADMIN — INSULIN HUMAN PRN UNIT: 100 INJECTION, SOLUTION PARENTERAL at 17:50

## 2018-06-23 RX ADMIN — Medication SCH EACH: at 17:43

## 2018-06-23 RX ADMIN — SODIUM CHLORIDE SCH MG: 9 INJECTION, SOLUTION INTRAVENOUS at 17:43

## 2018-06-23 RX ADMIN — CEFEPIME HYDROCHLORIDE SCH MLS/HR: 1 INJECTION, POWDER, FOR SOLUTION INTRAMUSCULAR; INTRAVENOUS at 05:59

## 2018-06-23 RX ADMIN — INSULIN HUMAN PRN UNIT: 100 INJECTION, SOLUTION PARENTERAL at 23:37

## 2018-06-23 RX ADMIN — Medication PRN ML: at 13:42

## 2018-06-23 RX ADMIN — Medication SCH EACH: at 00:51

## 2018-06-23 RX ADMIN — INSULIN HUMAN PRN UNIT: 100 INJECTION, SOLUTION PARENTERAL at 00:50

## 2018-06-23 RX ADMIN — DEXTROSE AND SODIUM CHLORIDE PRN MLS/HR: 5; 450 INJECTION, SOLUTION INTRAVENOUS at 09:02

## 2018-06-23 RX ADMIN — SODIUM CHLORIDE SCH MG: 9 INJECTION, SOLUTION INTRAVENOUS at 08:14

## 2018-06-23 RX ADMIN — INSULIN HUMAN PRN UNIT: 100 INJECTION, SOLUTION PARENTERAL at 06:02

## 2018-06-23 NOTE — NUR
blood sugar for 6am is 228mg/dl-6 units of regular insulin given per sliding scale,pts 
remains on mechanical ventilator , well tolerated , no sob no distress noted , will endorse 
to rn day shift  for  continuity of care.

## 2018-06-23 NOTE — NUR
TELE/RN CLOSING NOTES



PT REMAINED IN STABLE CONDITION. VENT SETTING TOLERATING WELL. NO ACUTE DISTRESS NOTED 
THROUGHOUT SHIFT. CURRENT GT FEEDING TOLERATING WELL. KEPT IV LINES INTACT AND PATENT. KEPT 
FC INTACT. SAFETY MEASURES OBSERVED AT ALL TIMES. ASPIRATION PRECAUTION MAINTAINED. ALL 
NEEDS ATTENDED. ENDORSED TO PM NURSE FOR HONEY

-------------------------------------------------------------------------------

Addendum: 06/23/18 at 2023 by JENAE CORTEZ RN

-------------------------------------------------------------------------------

NOTES FOR 1915

## 2018-06-23 NOTE — NUR
RN NOTES



RECEIVED PATIENT AWAKE IN BED. NO RESPIRATORY DISTRESS OR SHORTNESS OF BREATH. BREATHING 
EVEN AND UNLABORED. VENT SETTING WELL TOLERATED. NO PHYSICAL MANIFESTATION OF PAIN OR 
DISCOMFORT. GTUBE IN PLACE AND INTACT. FEEDING WELL TOLERATED. HOB ELEVATED. NO RESIDUAL. 
KEPT CLEAN AND DRY AND COMFORTABLE. WILL CONTINUE TO MONITOR.

## 2018-06-23 NOTE — NUR
BLOOD SUGAR  FOR 12MN   6 UNITS OF  REGULAR  INSULIN GIVEN AS ORDERED, PTS  CONTINUE  
ON D5 1/2 NS  INFUSING WELL  KEPT PTS  CLEAN DRY AND COMFORTABLE.

## 2018-06-23 NOTE — NUR
RN NOTES



CALLED DR CORBETT TO VERIFY IF OK TO START TPN OR RESUME GT FEEDING. PER MD, OK TO RESUME GT 
FEEDING AND WILL SEE PT ON MONDAY.



NOTIFIED SAKINA GARCIA MD AGREED

## 2018-06-23 NOTE — NUR
RT NOTE:

PATIENT RECEIVED TRACHED ON MECHANICAL VENT. ALARMS VERIFIED AND AUDIBLE. SUCTIONED 
MODERATE-LARGE THICK TAN SECRETIONS. VENT PLUGGED INTO RED OUTLET. AMBU BAG AT [facility].

## 2018-06-23 NOTE — NUR
RN NOTES



DNP MAYRA GARCIA ON FLOOR, NOTIFIED HIM THAT PT WAS SEEN BY DR CORBETT AND DR PERALTA. AGREED TO 
TPN. 



VERIFIED IF STILL WANTS TO CONT D5 1/2 NS, PER SAKINA GARCIA D/C D5 1/2 NS AND CHANGE TO NS @ 60 
ML/HR, THEN D/C IVF ONCE TPN INITIATED

## 2018-06-23 NOTE — NUR
TELE/RN INITIAL NOTES



RECEIVED REPORT FROM NIGHT NURSEJOI. PT IN BED IN SEMI OLIVAS POSITION, ASLEEP, OPEN EYES 
TO NAME AND TOUCH. ON VENT, TOLERATING CURRENT SETTINGS. IN NO SIGNS OF DISTRESS. NO SIGNS 
OF PAIN NOTED. WITH ONGOING IVF OF D5 1/2 NS @ 125 ML/HR INFUSING WELL ON RFA. RH SL INTACT. 
BOTH IN NO SIGNS OF INFECTION. FC IN PLACED DRAINING YELLOW COLORED URINE. GT INTACT AND 
CLAMPED. STILL ON NPO. ASPIRATION PRECAUTION OBSERVED. SAFETY MEASURES OBSERVED. CALL LIGHT 
WITHIN REACH. WILL CONT TO MONITOR

-------------------------------------------------------------------------------

Addendum: 06/23/18 at 0756 by JENAE CORTEZ RN

-------------------------------------------------------------------------------

ADD



SR ON TELEMONITOR

## 2018-06-23 NOTE — NUR
RN NOTES



RT NOTIFIED RN, MUCOMYST ROUTE WAS PO, PT ON NPO 



CALLED PHARMACY TO VERIFY. PER PHARMACIST WILL D/C ORDER

## 2018-06-24 VITALS — DIASTOLIC BLOOD PRESSURE: 82 MMHG | SYSTOLIC BLOOD PRESSURE: 137 MMHG

## 2018-06-24 VITALS — SYSTOLIC BLOOD PRESSURE: 129 MMHG | DIASTOLIC BLOOD PRESSURE: 82 MMHG

## 2018-06-24 VITALS — DIASTOLIC BLOOD PRESSURE: 77 MMHG | SYSTOLIC BLOOD PRESSURE: 122 MMHG

## 2018-06-24 VITALS — SYSTOLIC BLOOD PRESSURE: 139 MMHG | DIASTOLIC BLOOD PRESSURE: 93 MMHG

## 2018-06-24 VITALS — DIASTOLIC BLOOD PRESSURE: 82 MMHG | SYSTOLIC BLOOD PRESSURE: 125 MMHG

## 2018-06-24 VITALS — SYSTOLIC BLOOD PRESSURE: 136 MMHG | DIASTOLIC BLOOD PRESSURE: 86 MMHG

## 2018-06-24 LAB
BASOPHILS # BLD AUTO: 0 /CMM (ref 0–0.2)
BASOPHILS NFR BLD AUTO: 0.3 % (ref 0–2)
BUN SERPL-MCNC: 9 MG/DL (ref 7–18)
CALCIUM SERPL-MCNC: 8.7 MG/DL (ref 8.5–10.1)
CHLORIDE SERPL-SCNC: 112 MMOL/L (ref 98–107)
CO2 SERPL-SCNC: 23 MMOL/L (ref 21–32)
CREAT SERPL-MCNC: 1.8 MG/DL (ref 0.6–1.3)
EOSINOPHIL NFR BLD AUTO: 5.8 % (ref 0–6)
GLUCOSE SERPL-MCNC: 209 MG/DL (ref 74–106)
HCT VFR BLD AUTO: 33 % (ref 39–51)
HGB BLD-MCNC: 10.9 G/DL (ref 13.5–17.5)
LYMPHOCYTES NFR BLD AUTO: 2.4 /CMM (ref 0.8–4.8)
LYMPHOCYTES NFR BLD AUTO: 25.6 % (ref 20–44)
MAGNESIUM SERPL-MCNC: 2.2 MG/DL (ref 1.8–2.4)
MCHC RBC AUTO-ENTMCNC: 33 G/DL (ref 31–36)
MCV RBC AUTO: 90 FL (ref 80–96)
MONOCYTES NFR BLD AUTO: 0.4 /CMM (ref 0.1–1.3)
MONOCYTES NFR BLD AUTO: 3.7 % (ref 2–12)
NEUTROPHILS # BLD AUTO: 6.1 /CMM (ref 1.8–8.9)
NEUTROPHILS NFR BLD AUTO: 64.6 % (ref 43–81)
PHOSPHATE SERPL-MCNC: 4 MG/DL (ref 2.5–4.9)
PLATELET # BLD AUTO: 241 /CMM (ref 150–450)
POTASSIUM SERPL-SCNC: 3.1 MMOL/L (ref 3.5–5.1)
RBC # BLD AUTO: 3.73 MIL/UL (ref 4.5–6)
RDW COEFFICIENT OF VARIATION: 18.2 (ref 11.5–15)
SODIUM SERPL-SCNC: 146 MMOL/L (ref 136–145)
WBC NRBC COR # BLD AUTO: 9.4 K/UL (ref 4.3–11)

## 2018-06-24 RX ADMIN — Medication SCH EACH: at 06:17

## 2018-06-24 RX ADMIN — CEFEPIME HYDROCHLORIDE SCH MLS/HR: 1 INJECTION, POWDER, FOR SOLUTION INTRAMUSCULAR; INTRAVENOUS at 17:03

## 2018-06-24 RX ADMIN — SODIUM CHLORIDE SCH MG: 9 INJECTION, SOLUTION INTRAVENOUS at 09:14

## 2018-06-24 RX ADMIN — Medication SCH EACH: at 11:35

## 2018-06-24 RX ADMIN — INSULIN HUMAN PRN UNIT: 100 INJECTION, SOLUTION PARENTERAL at 06:16

## 2018-06-24 RX ADMIN — SODIUM CHLORIDE SCH MG: 9 INJECTION, SOLUTION INTRAVENOUS at 16:07

## 2018-06-24 RX ADMIN — CEFEPIME HYDROCHLORIDE SCH MLS/HR: 1 INJECTION, POWDER, FOR SOLUTION INTRAMUSCULAR; INTRAVENOUS at 06:15

## 2018-06-24 RX ADMIN — Medication PRN ML: at 17:35

## 2018-06-24 RX ADMIN — INSULIN HUMAN PRN UNIT: 100 INJECTION, SOLUTION PARENTERAL at 17:12

## 2018-06-24 RX ADMIN — INSULIN HUMAN PRN UNIT: 100 INJECTION, SOLUTION PARENTERAL at 11:39

## 2018-06-24 RX ADMIN — Medication SCH EACH: at 23:55

## 2018-06-24 RX ADMIN — Medication SCH EACH: at 17:03

## 2018-06-24 RX ADMIN — INSULIN HUMAN PRN UNIT: 100 INJECTION, SOLUTION PARENTERAL at 23:57

## 2018-06-24 NOTE — NUR
TELE RN INITIAL NOTES,



RECEIVED PATIENT IN BED WITH EYES CLOSED, OBTUNDED, NONVERBAL, UNABLE TO COMMUNICATE NEEDS 
AND CONCERNS, ON MECHANICAL VENTILATOR, TOLERATED VENT SETTINGS WELL, NO SOB/ACUTE DISTRESS 
NOTED AT THIS TIME,  IV SITES INTACT AND PATENT IN RIGHT HAND, IVF INFUSING WELL AND PATIENT 
TOLERATED WELL, NO S/S OF INFILTRATION NOTED AT THIS TIME,  ON GTF, INFUSING WELL AND 
PATIENT TOLERATED WELL, HOB ELEVATED AT ALL TIMES FOR ASPIRATION PRECAUTIONS,   ALL NEEDS 
PROVIDED AND ATTENDED, BED  LOCKED AND IN  LOWEST POSITION, REPOSITION PROVIDED,  ALL NEEDS 
ATTENDED AND RENDERED, CALL LIGHT W/I REACH, WILL CONTINUE TO MONITOR CLOSELY.

-------------------------------------------------------------------------------

Addendum: 06/25/18 at 0559 by VIOLA WATTS RN

-------------------------------------------------------------------------------

BOTH IV SITES INFILTRATED, MISTAKEN SINCE PATIENT IS HIGHLY EDEMATOUS  OF BILATERAL UPPER 
EXTREMITIES.

-------------------------------------------------------------------------------

Addendum: 06/25/18 at 0654 by VIOLA WATTS RN

-------------------------------------------------------------------------------

BOTH IV SITES INFILTRATED UPON INITIAL ROUNDS, MISTAKEN SINCE PATIENT IS HIGHLY EDEMATOUS OF 
BUE

## 2018-06-24 NOTE — NUR
RN CLOSING NOTES



NO SIGNIFICANT CHANGE OF CONDITION. BREATHING EVEN AND UNLABORED. VENT SETTING WELL 
TOLERATED. VITAL SIGNS WNL. KEPT CLEAN AND DRY. WILL ENDORSE TO AM SHIFT FOR CONTINUITY OF 
CARE.

## 2018-06-24 NOTE — NUR
RN TELE INITIAL NOTES



RECEIVED PT FROM PM NURSE, PT RESTING IN BED, NO ACUTE CHANGES NOTED. NO SOB, ALL SAFETY 
MEASURES INITIATED, PT NONVERBAL OBTUNDED, ON VENT SETTINGS AS MD ORDERED SAT ABOVE 97%, ON 
TELE  MON SB WITH HR 52, PELAYO CATH DRAINING URINE VIA GRAVITY, RT FA 22G IV, RT HAND 18 G 
IV ALL INTACT AND PATENT NO INFILTRATION NOTED. GTUBE FEEDING INFUSING GLUCERNA @ 50 ML/HR 
NO RESIDUAL NOTED. WILL INCREASE TO MD ORDER 75 ML/HR, WILL CONTINUE TO MONITOR.

## 2018-06-24 NOTE — NUR
RN TELE ENDING NOTES



PT WITH NO ACUTE CHANGES NOTED, BED BATH PROVIDED, WOUND TX AS ORDERED DONE, VENT SETTINGS 
TOLERATING WELL NO SOB OR DISTRESS, PELAYO CATH DRAINING URINE VIA GRAVITY. ON TELE MON SB, 
ACU CHECKS DONE Q6HRS, IV SITE INTACT, GTUBE FEEDING TOLERATING WELL AT 65 ML/HR, ALL DUE 
MEDS GIVEN, ALL NEEDS MET, WILL ENDORSE TO PM NURSE.

## 2018-06-25 VITALS — SYSTOLIC BLOOD PRESSURE: 125 MMHG | DIASTOLIC BLOOD PRESSURE: 84 MMHG

## 2018-06-25 VITALS — DIASTOLIC BLOOD PRESSURE: 75 MMHG | SYSTOLIC BLOOD PRESSURE: 126 MMHG

## 2018-06-25 VITALS — DIASTOLIC BLOOD PRESSURE: 71 MMHG | SYSTOLIC BLOOD PRESSURE: 115 MMHG

## 2018-06-25 VITALS — SYSTOLIC BLOOD PRESSURE: 126 MMHG | DIASTOLIC BLOOD PRESSURE: 78 MMHG

## 2018-06-25 VITALS — SYSTOLIC BLOOD PRESSURE: 128 MMHG | DIASTOLIC BLOOD PRESSURE: 78 MMHG

## 2018-06-25 VITALS — DIASTOLIC BLOOD PRESSURE: 85 MMHG | SYSTOLIC BLOOD PRESSURE: 126 MMHG

## 2018-06-25 VITALS — DIASTOLIC BLOOD PRESSURE: 78 MMHG | SYSTOLIC BLOOD PRESSURE: 128 MMHG

## 2018-06-25 LAB
BASOPHILS # BLD AUTO: 0 /CMM (ref 0–0.2)
BASOPHILS NFR BLD AUTO: 0.6 % (ref 0–2)
BUN SERPL-MCNC: 13 MG/DL (ref 7–18)
CALCIUM SERPL-MCNC: 8.8 MG/DL (ref 8.5–10.1)
CHLORIDE SERPL-SCNC: 113 MMOL/L (ref 98–107)
CO2 SERPL-SCNC: 24 MMOL/L (ref 21–32)
CREAT SERPL-MCNC: 1.9 MG/DL (ref 0.6–1.3)
EOSINOPHIL NFR BLD AUTO: 4.9 % (ref 0–6)
GLUCOSE SERPL-MCNC: 231 MG/DL (ref 74–106)
HCT VFR BLD AUTO: 33 % (ref 39–51)
HGB BLD-MCNC: 10.8 G/DL (ref 13.5–17.5)
LYMPHOCYTES NFR BLD AUTO: 2.4 /CMM (ref 0.8–4.8)
LYMPHOCYTES NFR BLD AUTO: 29.1 % (ref 20–44)
MAGNESIUM SERPL-MCNC: 2.2 MG/DL (ref 1.8–2.4)
MCHC RBC AUTO-ENTMCNC: 33 G/DL (ref 31–36)
MCV RBC AUTO: 90 FL (ref 80–96)
MONOCYTES NFR BLD AUTO: 0.6 /CMM (ref 0.1–1.3)
MONOCYTES NFR BLD AUTO: 7.4 % (ref 2–12)
NEUTROPHILS # BLD AUTO: 4.7 /CMM (ref 1.8–8.9)
NEUTROPHILS NFR BLD AUTO: 58 % (ref 43–81)
PHOSPHATE SERPL-MCNC: 3.8 MG/DL (ref 2.5–4.9)
PLATELET # BLD AUTO: 374 /CMM (ref 150–450)
POTASSIUM SERPL-SCNC: 3.2 MMOL/L (ref 3.5–5.1)
RBC # BLD AUTO: 3.62 MIL/UL (ref 4.5–6)
RDW COEFFICIENT OF VARIATION: 17.9 (ref 11.5–15)
SODIUM SERPL-SCNC: 148 MMOL/L (ref 136–145)
WBC NRBC COR # BLD AUTO: 8.1 K/UL (ref 4.3–11)

## 2018-06-25 RX ADMIN — INSULIN HUMAN PRN UNIT: 100 INJECTION, SOLUTION PARENTERAL at 23:34

## 2018-06-25 RX ADMIN — SODIUM CHLORIDE SCH MG: 9 INJECTION, SOLUTION INTRAVENOUS at 08:46

## 2018-06-25 RX ADMIN — Medication SCH EACH: at 06:09

## 2018-06-25 RX ADMIN — Medication PRN ML: at 06:48

## 2018-06-25 RX ADMIN — CEFEPIME HYDROCHLORIDE SCH MLS/HR: 1 INJECTION, POWDER, FOR SOLUTION INTRAMUSCULAR; INTRAVENOUS at 06:09

## 2018-06-25 RX ADMIN — INSULIN HUMAN PRN UNIT: 100 INJECTION, SOLUTION PARENTERAL at 12:45

## 2018-06-25 RX ADMIN — Medication SCH EACH: at 23:31

## 2018-06-25 RX ADMIN — INSULIN HUMAN PRN UNIT: 100 INJECTION, SOLUTION PARENTERAL at 17:31

## 2018-06-25 RX ADMIN — Medication SCH EACH: at 17:18

## 2018-06-25 RX ADMIN — INSULIN HUMAN PRN UNIT: 100 INJECTION, SOLUTION PARENTERAL at 06:11

## 2018-06-25 RX ADMIN — SODIUM CHLORIDE SCH MG: 9 INJECTION, SOLUTION INTRAVENOUS at 17:18

## 2018-06-25 RX ADMIN — Medication SCH EACH: at 12:40

## 2018-06-25 RX ADMIN — CEFEPIME HYDROCHLORIDE SCH MLS/HR: 1 INJECTION, POWDER, FOR SOLUTION INTRAMUSCULAR; INTRAVENOUS at 17:18

## 2018-06-25 NOTE — NUR
RN TELE ENDING NOTES



PT RESTING IN BED WITH NO ACUTE CHANGES NOTED, ALL DUE MEDS GIVEN, ALL NEEDS MET, ALL SAFETY 
MEASURES INITIATED. BED BATH PROVIDED, WOUND TX PROVIDED, WILL ENDORSE TO PM NURSE.

## 2018-06-25 NOTE — NUR
RN TELE INITIAL NOTES



RECEIVED REPORT AND PT FROM PM NURSE. PT RESTING IN BED, ALL SAFETY MEASURES INITIATED, 
OBTUNDED, NONVERBAL, ON TELE MON SB WITH HR 56 WITH OCC PVCS, PELAYO CATH DRAINING URINE VIA 
GRAVITY, ON VENT MANAGEMENT SETTINGS AS ORDERED BY MD SAT ABOVE 94%, NO SOB OR ACUTE 
DISTRESS, GTUBE FEEDING INTACT CLAMPED AND NO FEEDING AT THIS TIME DUE TO POSSIBLE EDG, RT 
WRIST IV SITE INTACT AND PATENT RUNNING IV FLUIDS AS MD ORDERED, WILL CONTINUE TO MONITOR.

## 2018-06-25 NOTE — NUR
RN OPENING NOTES:

RECEIVED PATIENT ON BED OBTUNDED, WITH SPONTANEOUS EYE OPENING BUT DOES NOT TRACK; [facility] AS ORDERED. NOT IN APPARENT DISTRESS, SUCTIONED PRN. SINUS JEANNIE ON 
THE MONITOR WITH NOTED TWAVE INVERSION. IV ACCESS PATENT ON R WRIST G22 IVF INFUSING AS 
ORDERED. GT INTACT, FEEDING ONGOING AS ORDERED. MONITORED FOR RESIDUALS. FC INTACT, DRAINING 
TO A CLOSED SYSTEM, NOTED WITH SLIGHT BLOOD TINGE ON URINE. MONITORED ACCORDINGLY; SAFETY 
MEASURES ENSURED,ASPIRATION PRECAUTION OBSERVED, CONTINUOUSLY MONITORED.

## 2018-06-25 NOTE — NUR
PATIENT RCVD TRACHED ON [facility] VENT WITH NOTED SETTINGS,  VENT PLUGGED INTO RED OUTLET ,VENT 
ALARMS SET AND AUDIBLE. CUFF PRESSURE CHECKED MLT.  NO RESPIRATORY DISTRESS NOTED.  
SUCTIONED WITH MODERATE AMOUNT OF PALE YELLOW THICK SECRETIONS. AMBU BAG AT [facility]. WILL 
CONTINUE TO MONITOR.

## 2018-06-25 NOTE — NUR
TELE RN CLOSING NOTES,



PATIENT IN BED WITH EYES CLOSED, OBTUNDED, NONVERBAL, UNABLE TO COMMUNICATE NEEDS AND 
CONCERNS, ON MECHANICAL VENTILATOR, TOLERATED VENT SETTINGS WELL, NO SOB/ACUTE DISTRESS 
NOTED AT THIS TIME,  NEW IV SITE IN RIGHT WRIST, INTACT AND PATENT, IVF INFUSING WELL AND 
PATIENT TOLERATED WELL,   ON GTF INFUSING WELL AND PATIENT TOLERATED WELL, NO RESIDUAL AT 
THIS TIME,  HOB ELEVATED AT ALL TIMES FOR ASPIRATION PRECAUTIONS,   ALL NEEDS PROVIDED AND 
ATTENDED, BED  LOCKED AND IN  LOWEST POSITION, NO SIGNIFICANT CHANGE OF CONDITION DURING 
NIGHT SHIFT,  CALL LIGHT W/I REACH, WILL ENDORSE CONTINUITY OF CARE TO ONCOMING NURSE.

## 2018-06-26 VITALS — SYSTOLIC BLOOD PRESSURE: 135 MMHG | DIASTOLIC BLOOD PRESSURE: 84 MMHG

## 2018-06-26 VITALS — DIASTOLIC BLOOD PRESSURE: 76 MMHG | SYSTOLIC BLOOD PRESSURE: 109 MMHG

## 2018-06-26 VITALS — DIASTOLIC BLOOD PRESSURE: 77 MMHG | SYSTOLIC BLOOD PRESSURE: 131 MMHG

## 2018-06-26 VITALS — DIASTOLIC BLOOD PRESSURE: 88 MMHG | SYSTOLIC BLOOD PRESSURE: 137 MMHG

## 2018-06-26 VITALS — DIASTOLIC BLOOD PRESSURE: 85 MMHG | SYSTOLIC BLOOD PRESSURE: 138 MMHG

## 2018-06-26 VITALS — SYSTOLIC BLOOD PRESSURE: 129 MMHG | DIASTOLIC BLOOD PRESSURE: 82 MMHG

## 2018-06-26 LAB
ALBUMIN SERPL BCP-MCNC: 2.1 G/DL (ref 3.4–5)
ALP SERPL-CCNC: 61 U/L (ref 46–116)
ALT SERPL W P-5'-P-CCNC: 20 U/L (ref 12–78)
AST SERPL W P-5'-P-CCNC: 21 U/L (ref 15–37)
BASOPHILS # BLD AUTO: 0 /CMM (ref 0–0.2)
BASOPHILS NFR BLD AUTO: 0.4 % (ref 0–2)
BILIRUB SERPL-MCNC: 0.1 MG/DL (ref 0.2–1)
BUN SERPL-MCNC: 15 MG/DL (ref 7–18)
CALCIUM SERPL-MCNC: 8.2 MG/DL (ref 8.5–10.1)
CHLORIDE SERPL-SCNC: 114 MMOL/L (ref 98–107)
CO2 SERPL-SCNC: 24 MMOL/L (ref 21–32)
CREAT SERPL-MCNC: 1.7 MG/DL (ref 0.6–1.3)
EOSINOPHIL NFR BLD AUTO: 5.9 % (ref 0–6)
GLUCOSE SERPL-MCNC: 237 MG/DL (ref 74–106)
HCT VFR BLD AUTO: 31 % (ref 39–51)
HGB BLD-MCNC: 10.3 G/DL (ref 13.5–17.5)
LYMPHOCYTES NFR BLD AUTO: 1.9 /CMM (ref 0.8–4.8)
LYMPHOCYTES NFR BLD AUTO: 27.3 % (ref 20–44)
MAGNESIUM SERPL-MCNC: 2 MG/DL (ref 1.8–2.4)
MCHC RBC AUTO-ENTMCNC: 33 G/DL (ref 31–36)
MCV RBC AUTO: 90 FL (ref 80–96)
MONOCYTES NFR BLD AUTO: 0.2 /CMM (ref 0.1–1.3)
MONOCYTES NFR BLD AUTO: 2.4 % (ref 2–12)
NEUTROPHILS # BLD AUTO: 4.6 /CMM (ref 1.8–8.9)
NEUTROPHILS NFR BLD AUTO: 64 % (ref 43–81)
PHOSPHATE SERPL-MCNC: 3.2 MG/DL (ref 2.5–4.9)
PLATELET # BLD AUTO: 329 /CMM (ref 150–450)
POTASSIUM SERPL-SCNC: 4.5 MMOL/L (ref 3.5–5.1)
PROT SERPL-MCNC: 6.6 G/DL (ref 6.4–8.2)
RBC # BLD AUTO: 3.48 MIL/UL (ref 4.5–6)
RDW COEFFICIENT OF VARIATION: 18.5 (ref 11.5–15)
SODIUM SERPL-SCNC: 147 MMOL/L (ref 136–145)
WBC NRBC COR # BLD AUTO: 7.1 K/UL (ref 4.3–11)

## 2018-06-26 RX ADMIN — Medication PRN ML: at 04:16

## 2018-06-26 RX ADMIN — CEFEPIME HYDROCHLORIDE SCH MLS/HR: 1 INJECTION, POWDER, FOR SOLUTION INTRAMUSCULAR; INTRAVENOUS at 17:03

## 2018-06-26 RX ADMIN — Medication SCH EACH: at 05:40

## 2018-06-26 RX ADMIN — INSULIN HUMAN PRN UNIT: 100 INJECTION, SOLUTION PARENTERAL at 12:00

## 2018-06-26 RX ADMIN — Medication SCH EACH: at 23:21

## 2018-06-26 RX ADMIN — Medication SCH EACH: at 17:09

## 2018-06-26 RX ADMIN — Medication PRN ML: at 16:55

## 2018-06-26 RX ADMIN — SODIUM CHLORIDE SCH MG: 9 INJECTION, SOLUTION INTRAVENOUS at 16:31

## 2018-06-26 RX ADMIN — INSULIN HUMAN PRN UNIT: 100 INJECTION, SOLUTION PARENTERAL at 05:49

## 2018-06-26 RX ADMIN — Medication SCH EACH: at 12:00

## 2018-06-26 RX ADMIN — CEFEPIME HYDROCHLORIDE SCH MLS/HR: 1 INJECTION, POWDER, FOR SOLUTION INTRAMUSCULAR; INTRAVENOUS at 05:40

## 2018-06-26 RX ADMIN — INSULIN HUMAN PRN UNIT: 100 INJECTION, SOLUTION PARENTERAL at 23:19

## 2018-06-26 RX ADMIN — SODIUM CHLORIDE SCH MG: 9 INJECTION, SOLUTION INTRAVENOUS at 08:41

## 2018-06-26 RX ADMIN — INSULIN HUMAN PRN UNIT: 100 INJECTION, SOLUTION PARENTERAL at 17:06

## 2018-06-26 NOTE — NUR
TELE RN NOTE

 SEEN BY DR BURNHAM PULMONOLOGIST NOTIFIED  THAT BOTH UPPER AND LOWER LETHALITIES WITH EDEMA  
STATED  TO CHANGE IVF TO 50 ML PER HOUR  ALSO NOTIFIED THAT PATIENT  HAS FACIAL TWITCHING, 
STATED HE WAS LIKE THIS BEFORE ,NO NEW ORDER GIVEN AT THIS TIME ,WILL CONT TO MONITOR 
CLOSELY

## 2018-06-26 NOTE — NUR
TELE RN NOTE 

 PATIENT  IN BED , ALL NEEDS ATTENDED  OBTUNDED , ON TELE MONITOR SB 54 , NOTE WITH FACIAL 
TWEETING  , WITH PELAYO CATH TO GRAVITY WITH YELLOW COLOR WITH SOME BLOOD SEDIMENT, ON G TUBE 
FEEDING AS ORDERED , KEEP HOB ELEVATED AT ALL TIME, RT WRIST HL INTACT WITH ON IVF AS 
ORDERER, BED IN LOWEST AND LOCKED POSITION , NO SOB NOTED AT THIS TIME ,WILL CONT TO MONITOR 
CLOSELY

## 2018-06-26 NOTE — NUR
FLAVIA RAMÍREZ NOTE

SEEN BY AND  SAKINA AWARE  THAT ANGELA STILL FACIAL TWITCHING  AND ASKED TO CHECK HIS  MED  
RECON ,STATED THAT WILL SEE SOON 

-------------------------------------------------------------------------------

Addendum: 06/26/18 at 1755 by ALEX MINA RN

-------------------------------------------------------------------------------

 SEEN BY JORJE PRESLEY CORRECTION IN SPELLING NOTIFIED THAT MED RECON IS NOT DONE  YET ,STATED 
THAT WILL CHECK IT OUT

## 2018-06-26 NOTE — NUR
TELE RN NOTE 

CONT ON G TUBE FEEDING AS ORDERED, KEEP HOB ELEVATED AT  ALL TIME .ON VENT SETTING AS 
ORDERED. WILL CONT TO MONITOR CLOSELY

## 2018-06-26 NOTE — NUR
TELE RN NOTE 

 SEEN BY DR MONCADA PODIATRIST ,  WILL F\U , ALL NEEDS ATTENDED, TRACH SUCTION DONE. WILL 
CONT TO MONITOR CLOSELY

## 2018-06-26 NOTE — NUR
RN CLOSING NOTES:

PATIENT REMAINED TRACH TO MECH VENT, NOT IN APPARENT DISTRESS ON CURRENT SETTINGS. NO ACUTE 
CHANGES OCCURRED OVERNIGHT. REMAINED BRADYCARDIC ON THE MONITOR. FEEDING AS ORDERED. URINE 
IN PELAYO BAG NOTED TO BE CLEARING UP. SKIN CARE RENDERED. SAFETY MEASURES AND ASPIRATION 
PRECAUTIONS ENSURED AT ALL TIMES. CONTINUOUSLY MONITORED.

## 2018-06-26 NOTE — NUR
PATIENT RECEIVED TRACHED ON MECHANICAL VENTILATION. PORTEX 9 CUFFED IN PLACE. SX DONE. SMALL 
THICK WHITE/YELLOW SECRETIONS NOTED. TRACH SECURED AND PATENT AT ALL TIMES. VENT PLUGGED 
INTO RED OUTLET. ALARMS ON AND AUDIBLE. ROGERIO BAG @ HOB. NO DISTRESS NOTED. MONITORED 
CLOSELY.

-------------------------------------------------------------------------------

Addendum: 06/26/18 at 1818 by EVANGELINA TAVERA RT

-------------------------------------------------------------------------------

Amended: Links added.

## 2018-06-26 NOTE — NUR
TELE RN NOTE

SEEN BY JORJE PRESLEY NOTIFIED THAT PATIENT IN TELE MONITOR   SR [id number] , BUT ASYMPTOMATIC , 
ALSO SEEN THAT PATIENT HAS FACIAL TWITCHING AND ABDOMINAL DISTENTION, STATED THAT HIS 
PERMANENT SIDE EFFECTS FROM MEDICATION OK TO POSSIBLE TO DISCHARGE,  WILL F\U WITH CASE 
MANAGER

## 2018-06-26 NOTE — NUR
TELE RN NOTES



RECEIVED PT ON BED. ON [facility] VENT SETTING SATURATING WELL. ON TELE MONITOR SB 52. PELAYO 
CATHETER DRAINING WELL. IV ACCESS ON RIGHT WRIST KCL 1/2 NS @50CC/HR. HEAD OF BED ELEVATED. 
SIDE RAILS UP. CALL LIGHT IS PLACED WITHIN REACH. WILL CONTINUE TO MONITOR PT CLOSELY.

## 2018-06-27 VITALS — DIASTOLIC BLOOD PRESSURE: 81 MMHG | SYSTOLIC BLOOD PRESSURE: 130 MMHG

## 2018-06-27 VITALS — SYSTOLIC BLOOD PRESSURE: 120 MMHG | DIASTOLIC BLOOD PRESSURE: 88 MMHG

## 2018-06-27 VITALS — SYSTOLIC BLOOD PRESSURE: 128 MMHG | DIASTOLIC BLOOD PRESSURE: 83 MMHG

## 2018-06-27 VITALS — DIASTOLIC BLOOD PRESSURE: 88 MMHG | SYSTOLIC BLOOD PRESSURE: 120 MMHG

## 2018-06-27 VITALS — DIASTOLIC BLOOD PRESSURE: 79 MMHG | SYSTOLIC BLOOD PRESSURE: 128 MMHG

## 2018-06-27 LAB
ALBUMIN SERPL BCP-MCNC: 2.2 G/DL (ref 3.4–5)
ALP SERPL-CCNC: 61 U/L (ref 46–116)
ALT SERPL W P-5'-P-CCNC: 23 U/L (ref 12–78)
AST SERPL W P-5'-P-CCNC: 25 U/L (ref 15–37)
BASOPHILS # BLD AUTO: 0.1 /CMM (ref 0–0.2)
BASOPHILS NFR BLD AUTO: 0.9 % (ref 0–2)
BILIRUB SERPL-MCNC: 0.1 MG/DL (ref 0.2–1)
BUN SERPL-MCNC: 16 MG/DL (ref 7–18)
CALCIUM SERPL-MCNC: 8.7 MG/DL (ref 8.5–10.1)
CHLORIDE SERPL-SCNC: 113 MMOL/L (ref 98–107)
CO2 SERPL-SCNC: 23 MMOL/L (ref 21–32)
CREAT SERPL-MCNC: 1.6 MG/DL (ref 0.6–1.3)
EOSINOPHIL NFR BLD AUTO: 5.6 % (ref 0–6)
GLUCOSE SERPL-MCNC: 268 MG/DL (ref 74–106)
HCT VFR BLD AUTO: 34 % (ref 39–51)
HGB BLD-MCNC: 10.9 G/DL (ref 13.5–17.5)
LYMPHOCYTES NFR BLD AUTO: 2 /CMM (ref 0.8–4.8)
LYMPHOCYTES NFR BLD AUTO: 25.4 % (ref 20–44)
MAGNESIUM SERPL-MCNC: 2.1 MG/DL (ref 1.8–2.4)
MCHC RBC AUTO-ENTMCNC: 32 G/DL (ref 31–36)
MCV RBC AUTO: 91 FL (ref 80–96)
MONOCYTES NFR BLD AUTO: 0.5 /CMM (ref 0.1–1.3)
MONOCYTES NFR BLD AUTO: 6.6 % (ref 2–12)
NEUTROPHILS # BLD AUTO: 4.8 /CMM (ref 1.8–8.9)
NEUTROPHILS NFR BLD AUTO: 61.5 % (ref 43–81)
PHOSPHATE SERPL-MCNC: 3.3 MG/DL (ref 2.5–4.9)
PLATELET # BLD AUTO: 338 /CMM (ref 150–450)
POTASSIUM SERPL-SCNC: 4.9 MMOL/L (ref 3.5–5.1)
PROT SERPL-MCNC: 7 G/DL (ref 6.4–8.2)
RBC # BLD AUTO: 3.72 MIL/UL (ref 4.5–6)
RDW COEFFICIENT OF VARIATION: 18.3 (ref 11.5–15)
SODIUM SERPL-SCNC: 145 MMOL/L (ref 136–145)
WBC NRBC COR # BLD AUTO: 7.9 K/UL (ref 4.3–11)

## 2018-06-27 RX ADMIN — SODIUM CHLORIDE SCH MG: 9 INJECTION, SOLUTION INTRAVENOUS at 09:19

## 2018-06-27 RX ADMIN — Medication SCH EACH: at 12:00

## 2018-06-27 RX ADMIN — Medication SCH EACH: at 05:07

## 2018-06-27 RX ADMIN — Medication PRN ML: at 05:12

## 2018-06-27 RX ADMIN — INSULIN HUMAN PRN UNIT: 100 INJECTION, SOLUTION PARENTERAL at 12:03

## 2018-06-27 RX ADMIN — INSULIN HUMAN PRN UNIT: 100 INJECTION, SOLUTION PARENTERAL at 05:06

## 2018-06-27 RX ADMIN — CEFEPIME HYDROCHLORIDE SCH MLS/HR: 1 INJECTION, POWDER, FOR SOLUTION INTRAMUSCULAR; INTRAVENOUS at 05:08

## 2018-06-27 NOTE — NUR
TELE RN NOTES



NO ACUTE CHANGES NOTED DURING THE SHIFT. PELAYO CATHETER DRAINING WELL AND INTACT. NO 
RESIDUAL NOTED AT THIS TIME. DUE MEDS GIVEN. PROVIDED COMFORT AND SAFETY. WILL ENDORSE TO 
THE AM NURSE FOR HONEY.

## 2018-06-27 NOTE — NUR
RT



PT RECEIVED WITH A PORTEX 9 TRACH ON THE VENT WITH NOTED SETTINGS. PT IS AWAKE BUT DOES NOT 
FOLLOW COMMANDS. VENT ALARMS ARE SET AND AUDIBLE WITH BVM BY BEDSIDE. MLT CUFF PRESSURE 
NOTED. VENT IS PLUGGED INTO RED OUTLET. PT SX'D SMALL WHITE THICK SECRETIONS. NO RESPIRATORY 
DISTRESS NOTED AT THIS TIME, WILL CONTINUE TO MONITOR.

-------------------------------------------------------------------------------

Addendum: 06/27/18 at 0820 by ELEANOR MALDONADO RT

-------------------------------------------------------------------------------

Amended: Links added.

## 2018-06-27 NOTE — NUR
TELE RN NOTES:



PT WAS DISCHARGED TO [facility] ACCOMPANIED BY 3 EMT. NO SIGNS/SYMPTOMS OF DISTRESS 
NOTED. NO SOB. 

-------------------------------------------------------------------------------

Addendum: 06/27/18 at 1541 by ALVA RAMÍREZ RN

-------------------------------------------------------------------------------

RN DISCHARGE NOTES



PT PICKED UP BY 3 AMBULANCE CREW TO TRANSPORT TO [facility].

PATIENT DISCHARGED PER MD IN STABLE CONDITION. PROVIDED DC INSTRUCTIONS, MED 
RECON/PRESCRIPTION, HEALTH TEACHINGS. PATIENT TO FOLLOW UP WITH PCP IN 1-2 WEEKS OR PER 
FACILITY PROTOCOL. PATIENT WITH PORTEX 9 TRACH, INTACT AND PATENT. IV ACCESS REMOVED, CATH 
TIP COMPLETE, NO BLEEDING, PRESSURE AND DRESSING APPLIED. PELAYO CATH IN PLACE, DRAINING 
CLEAR YELLOW URINE WITH 750 ML OUTPUT. NO BELONGINGS. PM CARE, PHOTOS OF SKIN ISSUES TAKEN 
AND PLACED IN CHART.



REPORT GIVEN TO [facility] STAFF EARLIER.

-------------------------------------------------------------------------------

Addendum: 06/27/18 at 1542 by ALVA RAMÍREZ RN

-------------------------------------------------------------------------------

ADDENDUM



GT SITE CLEAN AND INTACT AND CLAMPED.

## 2018-06-27 NOTE — NUR
TELE RN NOTES: 



RECEIVED PT ON BED, OBTUNDED. NO ACUTE DISTRESS NOTED. BREATHING EVEN AND UNLABORED WITH 
NORMAL RESPIRATIONS. [facility] AS ORDERED. NO FACIAL GRIMACING OR ANY SIGNS OF 
PAIN NOTED. ON TELE MONITOR SB HR 55. IV ON RIGHT WRIST G22 INTACT AND PATENT, WITH IV 1/2 
NS @ 50 ML/HR, INFUSING WELL. ON GTUBE FEEDING 75ML/HR, TOLERATING WELL. PELAYO CATH INTACT 
AND PATENT. TURNED AND REPOSITIONED Q2HRS. KEPT CLEAN, DRY AND COMFORTABLE. SAFETY AND FALL 
PRECAUTIONS OBSERVED AND MAINTAINED. WILL CONTINUE TO MONITOR.

## 2018-08-28 ENCOUNTER — HOSPITAL ENCOUNTER (OUTPATIENT)
Dept: HOSPITAL 54 - CT | Age: 58
Discharge: HOME | End: 2018-08-28
Payer: MEDICARE

## 2018-08-28 DIAGNOSIS — E11.9: ICD-10-CM

## 2018-08-28 DIAGNOSIS — R56.9: ICD-10-CM

## 2018-08-28 DIAGNOSIS — R18.8: ICD-10-CM

## 2018-08-28 DIAGNOSIS — K76.9: ICD-10-CM

## 2018-08-28 DIAGNOSIS — I25.10: ICD-10-CM

## 2018-08-28 DIAGNOSIS — I10: ICD-10-CM

## 2018-08-28 DIAGNOSIS — J43.9: Primary | ICD-10-CM

## 2018-08-28 PROCEDURE — 74160 CT ABDOMEN W/CONTRAST: CPT

## 2018-09-14 ENCOUNTER — HOSPITAL ENCOUNTER (INPATIENT)
Dept: HOSPITAL 54 - ER | Age: 58
LOS: 12 days | Discharge: NURSING, REHAB OR LONG-TERM CARE | DRG: 870 | End: 2018-09-26
Attending: NURSE PRACTITIONER | Admitting: NURSE PRACTITIONER
Payer: MEDICARE

## 2018-09-14 VITALS — SYSTOLIC BLOOD PRESSURE: 97 MMHG | DIASTOLIC BLOOD PRESSURE: 48 MMHG

## 2018-09-14 VITALS — SYSTOLIC BLOOD PRESSURE: 91 MMHG | DIASTOLIC BLOOD PRESSURE: 67 MMHG

## 2018-09-14 VITALS — DIASTOLIC BLOOD PRESSURE: 68 MMHG | SYSTOLIC BLOOD PRESSURE: 94 MMHG

## 2018-09-14 VITALS — HEIGHT: 71 IN | WEIGHT: 234 LBS | BODY MASS INDEX: 32.76 KG/M2

## 2018-09-14 DIAGNOSIS — D69.6: ICD-10-CM

## 2018-09-14 DIAGNOSIS — J69.0: ICD-10-CM

## 2018-09-14 DIAGNOSIS — D68.59: ICD-10-CM

## 2018-09-14 DIAGNOSIS — G93.40: ICD-10-CM

## 2018-09-14 DIAGNOSIS — K76.0: ICD-10-CM

## 2018-09-14 DIAGNOSIS — E44.0: ICD-10-CM

## 2018-09-14 DIAGNOSIS — Z86.73: ICD-10-CM

## 2018-09-14 DIAGNOSIS — D61.818: ICD-10-CM

## 2018-09-14 DIAGNOSIS — R65.21: ICD-10-CM

## 2018-09-14 DIAGNOSIS — E11.9: ICD-10-CM

## 2018-09-14 DIAGNOSIS — K94.23: ICD-10-CM

## 2018-09-14 DIAGNOSIS — N17.0: ICD-10-CM

## 2018-09-14 DIAGNOSIS — K29.80: ICD-10-CM

## 2018-09-14 DIAGNOSIS — A41.9: Primary | ICD-10-CM

## 2018-09-14 DIAGNOSIS — E87.0: ICD-10-CM

## 2018-09-14 DIAGNOSIS — E87.2: ICD-10-CM

## 2018-09-14 DIAGNOSIS — E66.9: ICD-10-CM

## 2018-09-14 DIAGNOSIS — Z87.440: ICD-10-CM
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
## 2018-09-14 DIAGNOSIS — Z93.0: ICD-10-CM

## 2018-09-14 DIAGNOSIS — D50.9: ICD-10-CM

## 2018-09-14 DIAGNOSIS — K29.71: ICD-10-CM

## 2018-09-14 DIAGNOSIS — I50.9: ICD-10-CM

## 2018-09-14 DIAGNOSIS — K22.70: ICD-10-CM

## 2018-09-14 DIAGNOSIS — G40.909: ICD-10-CM

## 2018-09-14 DIAGNOSIS — J96.21: ICD-10-CM

## 2018-09-14 DIAGNOSIS — E87.6: ICD-10-CM

## 2018-09-14 DIAGNOSIS — Z79.4: ICD-10-CM

## 2018-09-14 DIAGNOSIS — Z99.11: ICD-10-CM

## 2018-09-14 DIAGNOSIS — R53.2: ICD-10-CM

## 2018-09-14 DIAGNOSIS — F09: ICD-10-CM

## 2018-09-14 DIAGNOSIS — J95.851: ICD-10-CM

## 2018-09-14 DIAGNOSIS — E86.0: ICD-10-CM

## 2018-09-14 DIAGNOSIS — I25.10: ICD-10-CM

## 2018-09-14 DIAGNOSIS — E83.52: ICD-10-CM

## 2018-09-14 DIAGNOSIS — L84: ICD-10-CM

## 2018-09-14 DIAGNOSIS — I11.0: ICD-10-CM

## 2018-09-14 DIAGNOSIS — R40.3: ICD-10-CM

## 2018-09-14 DIAGNOSIS — K21.0: ICD-10-CM

## 2018-09-14 DIAGNOSIS — K72.00: ICD-10-CM

## 2018-09-14 DIAGNOSIS — R74.0: ICD-10-CM

## 2018-09-14 DIAGNOSIS — K44.9: ICD-10-CM

## 2018-09-14 DIAGNOSIS — E77.8: ICD-10-CM

## 2018-09-14 DIAGNOSIS — Z74.01: ICD-10-CM

## 2018-09-14 DIAGNOSIS — L89.610: ICD-10-CM
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
## 2018-09-14 DIAGNOSIS — J15.6: ICD-10-CM

## 2018-09-14 LAB
ALBUMIN SERPL BCP-MCNC: 2.4 G/DL (ref 3.4–5)
ALP SERPL-CCNC: 118 U/L (ref 46–116)
ALT SERPL W P-5'-P-CCNC: 127 U/L (ref 12–78)
APPEARANCE UR: CLEAR
APTT PPP: 37 SEC (ref 23–34)
AST SERPL W P-5'-P-CCNC: 93 U/L (ref 15–37)
BILIRUB DIRECT SERPL-MCNC: 0.1 MG/DL (ref 0–0.2)
BILIRUB SERPL-MCNC: 0.3 MG/DL (ref 0.2–1)
BILIRUB UR QL STRIP: NEGATIVE
BUN SERPL-MCNC: 24 MG/DL (ref 7–18)
CALCIUM SERPL-MCNC: 10.2 MG/DL (ref 8.5–10.1)
CHLORIDE SERPL-SCNC: 105 MMOL/L (ref 98–107)
CO2 SERPL-SCNC: 27 MMOL/L (ref 21–32)
COLOR UR: YELLOW
CREAT SERPL-MCNC: 1 MG/DL (ref 0.6–1.3)
GLUCOSE SERPL-MCNC: 146 MG/DL (ref 74–106)
GLUCOSE UR STRIP-MCNC: NEGATIVE MG/DL
HCT VFR BLD AUTO: 34 % (ref 39–51)
HGB BLD-MCNC: 11.3 G/DL (ref 13.5–17.5)
HGB UR QL STRIP: NEGATIVE ERY/UL
INR PPP: 1.22 (ref 0.85–1.15)
IRON SERPL-MCNC: 25 UG/DL (ref 50–175)
KETONES UR STRIP-MCNC: NEGATIVE MG/DL
LEUKOCYTE ESTERASE UR QL STRIP: NEGATIVE
LYMPHOCYTES NFR BLD AUTO: 11 % (ref 20–44)
LYMPHOCYTES NFR BLD MANUAL: 8 % (ref 16–48)
MCHC RBC AUTO-ENTMCNC: 34 G/DL (ref 31–36)
MCV RBC AUTO: 92 FL (ref 80–96)
METAMYELOCYTES NFR BLD MANUAL: 1 % (ref 0–0)
MONOCYTES NFR BLD AUTO: 2 % (ref 2–12)
MONOCYTES NFR BLD MANUAL: 1 % (ref 0–11)
NEUTROPHILS NFR BLD AUTO: 87 % (ref 43–81)
NEUTS BAND NFR BLD MANUAL: 51 % (ref 0–5)
NEUTS SEG NFR BLD MANUAL: 36 % (ref 42–76)
NITRITE UR QL STRIP: NEGATIVE
PH UR STRIP: 7 [PH] (ref 5–8)
PLATELET # BLD AUTO: 31 /CMM (ref 150–450)
POTASSIUM SERPL-SCNC: 4.2 MMOL/L (ref 3.5–5.1)
PROT SERPL-MCNC: 7.8 G/DL (ref 6.4–8.2)
PROT UR QL STRIP: NEGATIVE MG/DL
RBC # BLD AUTO: 3.66 MIL/UL (ref 4.5–6)
RDW COEFFICIENT OF VARIATION: 20.5 (ref 11.5–15)
SODIUM SERPL-SCNC: 143 MMOL/L (ref 136–145)
TIBC SERPL-MCNC: 297 UG/DL (ref 250–450)
TROPONIN I SERPL-MCNC: < 0.017 NG/ML (ref 0–0.06)
UROBILINOGEN UR STRIP-MCNC: 0.2 EU/DL
VARIANT LYMPHS NFR BLD MANUAL: 3 % (ref 0–0)
WBC NRBC COR # BLD AUTO: 11.2 K/UL (ref 4.3–11)

## 2018-09-14 PROCEDURE — C9113 INJ PANTOPRAZOLE SODIUM, VIA: HCPCS

## 2018-09-14 PROCEDURE — A6402 STERILE GAUZE <= 16 SQ IN: HCPCS

## 2018-09-14 PROCEDURE — Z7610: HCPCS

## 2018-09-14 PROCEDURE — 5A1955Z RESPIRATORY VENTILATION, GREATER THAN 96 CONSECUTIVE HOURS: ICD-10-PCS | Performed by: INTERNAL MEDICINE

## 2018-09-14 PROCEDURE — C1751 CATH, INF, PER/CENT/MIDLINE: HCPCS

## 2018-09-14 PROCEDURE — A4606 OXYGEN PROBE USED W OXIMETER: HCPCS

## 2018-09-14 NOTE — NUR
-------------------------------------------------------------------------------

           *** Note undone in ED - 09/14/18 at 2137 by BECKIE ***            

-------------------------------------------------------------------------------

INFORMED DR. BOLES CURRENT BP 86/60, ORDERS TO GIVE PT 1 L NS BOLUS NOW.

## 2018-09-14 NOTE — NUR
SPOKE TO DR. BOLES REGARDING COLLECTING BLOOD CULTURES. PER MD TO HOLD ON 
DRAWING BLOOD CULTURES. OK TO GIVE IV ATB.

## 2018-09-14 NOTE — NUR
TARAN FROM [facility] FOR "COFFEE GROUND RESIDUAL IN G-TUBE". PT OBTUNDED, 
EYE OPEN, CONNECTED TO VENT WITH PRESCRIBED SETTINGS: AC 12  FI02 80% 
PEEP 5. NO SOB. NO NVD AT THIS TIME. PT NOTED WITH GTUBE INTACT AND PATENT. NO 
RESIDUALS NOTED. NO S/S INFECTION AT THIS TIME. PT NOTED WITH UPPER AND LOWER 
BILATERAL EDEMA. PT GOWNED AND PLACED ON MONITOR WAITING FOR MD SILVA.

## 2018-09-14 NOTE — NUR
ATTEMPTED TO CONTACT FAMILY MEMBER REGARDING BLOOD TRANSFUSION CONSENT FORM, 
UNABLE TO LEAVE MESSAGE.

## 2018-09-14 NOTE — NUR
-------------------------------------------------------------------------------

           *** Note undone in ED - 09/14/18 at 2214 by BECKIE ***            

-------------------------------------------------------------------------------

SPOKE TO DR. BOLES REGARDING COLLECTING BLOOD CULTURES. PER MD TO HOLD ON 
DRAWING BLOOD CULTURES.

## 2018-09-15 VITALS — SYSTOLIC BLOOD PRESSURE: 91 MMHG | DIASTOLIC BLOOD PRESSURE: 50 MMHG

## 2018-09-15 VITALS — DIASTOLIC BLOOD PRESSURE: 58 MMHG | SYSTOLIC BLOOD PRESSURE: 93 MMHG

## 2018-09-15 VITALS — SYSTOLIC BLOOD PRESSURE: 102 MMHG | DIASTOLIC BLOOD PRESSURE: 67 MMHG

## 2018-09-15 VITALS — DIASTOLIC BLOOD PRESSURE: 55 MMHG | SYSTOLIC BLOOD PRESSURE: 86 MMHG

## 2018-09-15 VITALS — DIASTOLIC BLOOD PRESSURE: 63 MMHG | SYSTOLIC BLOOD PRESSURE: 93 MMHG

## 2018-09-15 VITALS — SYSTOLIC BLOOD PRESSURE: 91 MMHG | DIASTOLIC BLOOD PRESSURE: 65 MMHG

## 2018-09-15 VITALS — SYSTOLIC BLOOD PRESSURE: 93 MMHG | DIASTOLIC BLOOD PRESSURE: 63 MMHG

## 2018-09-15 VITALS — DIASTOLIC BLOOD PRESSURE: 51 MMHG | SYSTOLIC BLOOD PRESSURE: 85 MMHG

## 2018-09-15 VITALS — SYSTOLIC BLOOD PRESSURE: 84 MMHG | DIASTOLIC BLOOD PRESSURE: 55 MMHG

## 2018-09-15 VITALS — DIASTOLIC BLOOD PRESSURE: 69 MMHG | SYSTOLIC BLOOD PRESSURE: 90 MMHG

## 2018-09-15 VITALS — SYSTOLIC BLOOD PRESSURE: 91 MMHG | DIASTOLIC BLOOD PRESSURE: 53 MMHG

## 2018-09-15 VITALS — DIASTOLIC BLOOD PRESSURE: 56 MMHG | SYSTOLIC BLOOD PRESSURE: 89 MMHG

## 2018-09-15 VITALS — SYSTOLIC BLOOD PRESSURE: 84 MMHG | DIASTOLIC BLOOD PRESSURE: 51 MMHG

## 2018-09-15 VITALS — SYSTOLIC BLOOD PRESSURE: 104 MMHG | DIASTOLIC BLOOD PRESSURE: 60 MMHG

## 2018-09-15 VITALS — SYSTOLIC BLOOD PRESSURE: 88 MMHG | DIASTOLIC BLOOD PRESSURE: 63 MMHG

## 2018-09-15 VITALS — SYSTOLIC BLOOD PRESSURE: 99 MMHG | DIASTOLIC BLOOD PRESSURE: 61 MMHG

## 2018-09-15 VITALS — SYSTOLIC BLOOD PRESSURE: 96 MMHG | DIASTOLIC BLOOD PRESSURE: 62 MMHG

## 2018-09-15 VITALS — SYSTOLIC BLOOD PRESSURE: 85 MMHG | DIASTOLIC BLOOD PRESSURE: 59 MMHG

## 2018-09-15 VITALS — DIASTOLIC BLOOD PRESSURE: 67 MMHG | SYSTOLIC BLOOD PRESSURE: 102 MMHG

## 2018-09-15 VITALS — SYSTOLIC BLOOD PRESSURE: 96 MMHG | DIASTOLIC BLOOD PRESSURE: 51 MMHG

## 2018-09-15 VITALS — DIASTOLIC BLOOD PRESSURE: 62 MMHG | SYSTOLIC BLOOD PRESSURE: 94 MMHG

## 2018-09-15 VITALS — SYSTOLIC BLOOD PRESSURE: 87 MMHG | DIASTOLIC BLOOD PRESSURE: 56 MMHG

## 2018-09-15 VITALS — SYSTOLIC BLOOD PRESSURE: 92 MMHG | DIASTOLIC BLOOD PRESSURE: 62 MMHG

## 2018-09-15 VITALS — DIASTOLIC BLOOD PRESSURE: 57 MMHG | SYSTOLIC BLOOD PRESSURE: 84 MMHG

## 2018-09-15 VITALS — SYSTOLIC BLOOD PRESSURE: 92 MMHG | DIASTOLIC BLOOD PRESSURE: 58 MMHG

## 2018-09-15 VITALS — SYSTOLIC BLOOD PRESSURE: 90 MMHG | DIASTOLIC BLOOD PRESSURE: 58 MMHG

## 2018-09-15 VITALS — SYSTOLIC BLOOD PRESSURE: 91 MMHG | DIASTOLIC BLOOD PRESSURE: 59 MMHG

## 2018-09-15 VITALS — DIASTOLIC BLOOD PRESSURE: 51 MMHG | SYSTOLIC BLOOD PRESSURE: 84 MMHG

## 2018-09-15 VITALS — DIASTOLIC BLOOD PRESSURE: 57 MMHG | SYSTOLIC BLOOD PRESSURE: 97 MMHG

## 2018-09-15 VITALS — SYSTOLIC BLOOD PRESSURE: 96 MMHG | DIASTOLIC BLOOD PRESSURE: 54 MMHG

## 2018-09-15 VITALS — DIASTOLIC BLOOD PRESSURE: 54 MMHG | SYSTOLIC BLOOD PRESSURE: 102 MMHG

## 2018-09-15 VITALS — SYSTOLIC BLOOD PRESSURE: 96 MMHG | DIASTOLIC BLOOD PRESSURE: 55 MMHG

## 2018-09-15 VITALS — DIASTOLIC BLOOD PRESSURE: 60 MMHG | SYSTOLIC BLOOD PRESSURE: 105 MMHG

## 2018-09-15 VITALS — SYSTOLIC BLOOD PRESSURE: 90 MMHG | DIASTOLIC BLOOD PRESSURE: 60 MMHG

## 2018-09-15 VITALS — DIASTOLIC BLOOD PRESSURE: 54 MMHG | SYSTOLIC BLOOD PRESSURE: 90 MMHG

## 2018-09-15 VITALS — SYSTOLIC BLOOD PRESSURE: 92 MMHG | DIASTOLIC BLOOD PRESSURE: 69 MMHG

## 2018-09-15 VITALS — SYSTOLIC BLOOD PRESSURE: 90 MMHG | DIASTOLIC BLOOD PRESSURE: 54 MMHG

## 2018-09-15 VITALS — SYSTOLIC BLOOD PRESSURE: 96 MMHG | DIASTOLIC BLOOD PRESSURE: 61 MMHG

## 2018-09-15 VITALS — SYSTOLIC BLOOD PRESSURE: 86 MMHG | DIASTOLIC BLOOD PRESSURE: 54 MMHG

## 2018-09-15 VITALS — SYSTOLIC BLOOD PRESSURE: 72 MMHG | DIASTOLIC BLOOD PRESSURE: 52 MMHG

## 2018-09-15 VITALS — DIASTOLIC BLOOD PRESSURE: 56 MMHG | SYSTOLIC BLOOD PRESSURE: 92 MMHG

## 2018-09-15 VITALS — SYSTOLIC BLOOD PRESSURE: 88 MMHG | DIASTOLIC BLOOD PRESSURE: 60 MMHG

## 2018-09-15 VITALS — SYSTOLIC BLOOD PRESSURE: 79 MMHG | DIASTOLIC BLOOD PRESSURE: 55 MMHG

## 2018-09-15 VITALS — SYSTOLIC BLOOD PRESSURE: 82 MMHG | DIASTOLIC BLOOD PRESSURE: 62 MMHG

## 2018-09-15 VITALS — DIASTOLIC BLOOD PRESSURE: 63 MMHG | SYSTOLIC BLOOD PRESSURE: 94 MMHG

## 2018-09-15 VITALS — DIASTOLIC BLOOD PRESSURE: 51 MMHG | SYSTOLIC BLOOD PRESSURE: 86 MMHG

## 2018-09-15 VITALS — DIASTOLIC BLOOD PRESSURE: 53 MMHG | SYSTOLIC BLOOD PRESSURE: 98 MMHG

## 2018-09-15 VITALS — SYSTOLIC BLOOD PRESSURE: 84 MMHG | DIASTOLIC BLOOD PRESSURE: 52 MMHG

## 2018-09-15 VITALS — SYSTOLIC BLOOD PRESSURE: 88 MMHG | DIASTOLIC BLOOD PRESSURE: 55 MMHG

## 2018-09-15 VITALS — DIASTOLIC BLOOD PRESSURE: 61 MMHG | SYSTOLIC BLOOD PRESSURE: 93 MMHG

## 2018-09-15 VITALS — SYSTOLIC BLOOD PRESSURE: 95 MMHG | DIASTOLIC BLOOD PRESSURE: 55 MMHG

## 2018-09-15 VITALS — DIASTOLIC BLOOD PRESSURE: 85 MMHG | SYSTOLIC BLOOD PRESSURE: 134 MMHG

## 2018-09-15 VITALS — DIASTOLIC BLOOD PRESSURE: 57 MMHG | SYSTOLIC BLOOD PRESSURE: 87 MMHG

## 2018-09-15 VITALS — DIASTOLIC BLOOD PRESSURE: 52 MMHG | SYSTOLIC BLOOD PRESSURE: 91 MMHG

## 2018-09-15 LAB
ALBUMIN SERPL BCP-MCNC: 1.8 G/DL (ref 3.4–5)
ALP SERPL-CCNC: 84 U/L (ref 46–116)
ALT SERPL W P-5'-P-CCNC: 89 U/L (ref 12–78)
APTT PPP: 41 SEC (ref 23–34)
AST SERPL W P-5'-P-CCNC: 75 U/L (ref 15–37)
BASE EXCESS BLDA CALC-SCNC: -1.7 MMOL/L
BASOPHILS # BLD AUTO: 0 /CMM (ref 0–0.2)
BASOPHILS NFR BLD AUTO: 0 % (ref 0–2)
BASOPHILS NFR BLD AUTO: 0 % (ref 0–2)
BASOPHILS NFR BLD AUTO: 0.1 % (ref 0–2)
BASOPHILS NFR BLD AUTO: 1 % (ref 0–2)
BASOPHILS NFR BLD MANUAL: 0 % (ref 0–2)
BASOPHILS NFR BLD MANUAL: 1 % (ref 0–2)
BILIRUB DIRECT SERPL-MCNC: 0.1 MG/DL (ref 0–0.2)
BILIRUB SERPL-MCNC: 0.4 MG/DL (ref 0.2–1)
BUN SERPL-MCNC: 20 MG/DL (ref 7–18)
CALCIUM SERPL-MCNC: 8.7 MG/DL (ref 8.5–10.1)
CHLORIDE SERPL-SCNC: 111 MMOL/L (ref 98–107)
CHOLEST SERPL-MCNC: 135 MG/DL (ref ?–200)
CO2 SERPL-SCNC: 25 MMOL/L (ref 21–32)
CREAT SERPL-MCNC: 1 MG/DL (ref 0.6–1.3)
D DIMER PPP FEU-MCNC: 1.18 MG/L(FEU (ref 0.17–0.5)
DO-HGB MFR BLDA: 466.5 MMHG
EOSINOPHIL NFR BLD AUTO: 0.1 % (ref 0–6)
EOSINOPHIL NFR BLD AUTO: 0.3 % (ref 0–6)
EOSINOPHIL NFR BLD AUTO: 1 % (ref 0–6)
EOSINOPHIL NFR BLD AUTO: 1 % (ref 0–6)
EOSINOPHIL NFR BLD MANUAL: 0 % (ref 0–4)
EOSINOPHIL NFR BLD MANUAL: 1 % (ref 0–4)
EOSINOPHIL NFR BLD MANUAL: 1 % (ref 0–4)
FIBRINOGEN PPP-MCNC: 581 MG/DL (ref 213–485)
GLUCOSE SERPL-MCNC: 169 MG/DL (ref 74–106)
HCT VFR BLD AUTO: 26 % (ref 39–51)
HCT VFR BLD AUTO: 27 % (ref 39–51)
HCT VFR BLD AUTO: 28 % (ref 39–51)
HCT VFR BLD AUTO: 30 % (ref 39–51)
HCT VFR BLD AUTO: 34 % (ref 39–51)
HDLC SERPL-MCNC: 12 MG/DL (ref 40–60)
HGB BLD-MCNC: 10.8 G/DL (ref 13.5–17.5)
HGB BLD-MCNC: 8.5 G/DL (ref 13.5–17.5)
HGB BLD-MCNC: 8.6 G/DL (ref 13.5–17.5)
HGB BLD-MCNC: 9.1 G/DL (ref 13.5–17.5)
HGB BLD-MCNC: 9.3 G/DL (ref 13.5–17.5)
INHALED O2 CONCENTRATION: 80 %
INR PPP: 1.27 (ref 0.87–1.13)
LDLC SERPL DIRECT ASSAY-MCNC: 40 MG/DL (ref 0–99)
LYMPHOCYTES NFR BLD AUTO: 0.4 /CMM (ref 0.8–4.8)
LYMPHOCYTES NFR BLD AUTO: 0.6 /CMM (ref 0.8–4.8)
LYMPHOCYTES NFR BLD AUTO: 0.8 /CMM (ref 0.8–4.8)
LYMPHOCYTES NFR BLD AUTO: 4.2 % (ref 20–44)
LYMPHOCYTES NFR BLD AUTO: 5 % (ref 20–44)
LYMPHOCYTES NFR BLD AUTO: 5.6 % (ref 20–44)
LYMPHOCYTES NFR BLD AUTO: 7.6 % (ref 20–44)
LYMPHOCYTES NFR BLD MANUAL: 12 % (ref 16–48)
LYMPHOCYTES NFR BLD MANUAL: 3 % (ref 16–48)
LYMPHOCYTES NFR BLD MANUAL: 5 % (ref 16–48)
LYMPHOCYTES NFR BLD MANUAL: 5 % (ref 16–48)
MAGNESIUM SERPL-MCNC: 2.9 MG/DL (ref 1.8–2.4)
MCHC RBC AUTO-ENTMCNC: 31 G/DL (ref 31–36)
MCHC RBC AUTO-ENTMCNC: 32 G/DL (ref 31–36)
MCHC RBC AUTO-ENTMCNC: 32 G/DL (ref 31–36)
MCHC RBC AUTO-ENTMCNC: 33 G/DL (ref 31–36)
MCHC RBC AUTO-ENTMCNC: 33 G/DL (ref 31–36)
MCV RBC AUTO: 93 FL (ref 80–96)
MCV RBC AUTO: 93 FL (ref 80–96)
MCV RBC AUTO: 94 FL (ref 80–96)
MONOCYTES NFR BLD AUTO: 0.1 /CMM (ref 0.1–1.3)
MONOCYTES NFR BLD AUTO: 0.2 /CMM (ref 0.1–1.3)
MONOCYTES NFR BLD AUTO: 0.2 /CMM (ref 0.1–1.3)
MONOCYTES NFR BLD AUTO: 1.2 % (ref 2–12)
MONOCYTES NFR BLD AUTO: 1.7 % (ref 2–12)
MONOCYTES NFR BLD AUTO: 1.9 % (ref 2–12)
MONOCYTES NFR BLD AUTO: 2 % (ref 2–12)
MONOCYTES NFR BLD MANUAL: 1 % (ref 0–11)
MONOCYTES NFR BLD MANUAL: 1 % (ref 0–11)
MONOCYTES NFR BLD MANUAL: 2 % (ref 0–11)
MONOCYTES NFR BLD MANUAL: 9 % (ref 0–11)
NEUTROPHILS # BLD AUTO: 10.2 /CMM (ref 1.8–8.9)
NEUTROPHILS # BLD AUTO: 17.3 /CMM (ref 1.8–8.9)
NEUTROPHILS # BLD AUTO: 5.1 /CMM (ref 1.8–8.9)
NEUTROPHILS NFR BLD AUTO: 90.2 % (ref 43–81)
NEUTROPHILS NFR BLD AUTO: 91 % (ref 43–81)
NEUTROPHILS NFR BLD AUTO: 91.6 % (ref 43–81)
NEUTROPHILS NFR BLD AUTO: 94.5 % (ref 43–81)
NEUTS BAND NFR BLD MANUAL: 50 % (ref 0–5)
NEUTS BAND NFR BLD MANUAL: 59 % (ref 0–5)
NEUTS BAND NFR BLD MANUAL: 60 % (ref 0–5)
NEUTS BAND NFR BLD MANUAL: 61 % (ref 0–5)
NEUTS BAND NFR BLD MANUAL: 65 % (ref 0–5)
NEUTS SEG NFR BLD MANUAL: 29 % (ref 42–76)
NEUTS SEG NFR BLD MANUAL: 29 % (ref 42–76)
NEUTS SEG NFR BLD MANUAL: 30 % (ref 42–76)
NEUTS SEG NFR BLD MANUAL: 35 % (ref 42–76)
NEUTS SEG NFR BLD MANUAL: 41 % (ref 42–76)
PCO2 TEMP ADJ BLDA: 42.5 MMHG (ref 35–45)
PH TEMP ADJ BLDA: 7.36 [PH] (ref 7.35–7.45)
PHOSPHATE SERPL-MCNC: 6.3 MG/DL (ref 2.5–4.9)
PLATELET # BLD AUTO: 39 /CMM (ref 150–450)
PLATELET # BLD AUTO: 85 /CMM (ref 150–450)
PLATELET # BLD AUTO: 88 /CMM (ref 150–450)
PLATELET # BLD AUTO: 94 /CMM (ref 150–450)
PO2 TEMP ADJ BLDA: 59.3 MMHG (ref 75–100)
POTASSIUM SERPL-SCNC: 3.6 MMOL/L (ref 3.5–5.1)
PROT SERPL-MCNC: 5.9 G/DL (ref 6.4–8.2)
RBC # BLD AUTO: 2.81 MIL/UL (ref 4.5–6)
RBC # BLD AUTO: 2.88 MIL/UL (ref 4.5–6)
RBC # BLD AUTO: 3.02 MIL/UL (ref 4.5–6)
RBC # BLD AUTO: 3.16 MIL/UL (ref 4.5–6)
RBC # BLD AUTO: 3.64 MIL/UL (ref 4.5–6)
RDW COEFFICIENT OF VARIATION: 20.7 (ref 11.5–15)
RDW COEFFICIENT OF VARIATION: 21.1 (ref 11.5–15)
RDW COEFFICIENT OF VARIATION: 21.6 (ref 11.5–15)
RDW COEFFICIENT OF VARIATION: 21.6 (ref 11.5–15)
RDW COEFFICIENT OF VARIATION: 21.9 (ref 11.5–15)
SAO2 % BLDA: 87.3 % (ref 92–98.5)
SODIUM SERPL-SCNC: 148 MMOL/L (ref 136–145)
TRIGL SERPL-MCNC: 382 MG/DL (ref 30–150)
TSH SERPL DL<=0.005 MIU/L-ACNC: 3.72 UIU/ML (ref 0.36–3.74)
VENTILATION MODE VENT: (no result)
WBC NRBC COR # BLD AUTO: 11.1 K/UL (ref 4.3–11)
WBC NRBC COR # BLD AUTO: 18.4 K/UL (ref 4.3–11)
WBC NRBC COR # BLD AUTO: 5.6 K/UL (ref 4.3–11)
WBC NRBC COR # BLD AUTO: 6.9 K/UL (ref 4.3–11)
WBC NRBC COR # BLD AUTO: 7.3 K/UL (ref 4.3–11)

## 2018-09-15 PROCEDURE — 30233R1 TRANSFUSION OF NONAUTOLOGOUS PLATELETS INTO PERIPHERAL VEIN, PERCUTANEOUS APPROACH: ICD-10-PCS | Performed by: NURSE PRACTITIONER

## 2018-09-15 RX ADMIN — Medication SCH MG: at 14:08

## 2018-09-15 RX ADMIN — INSULIN HUMAN PRN UNIT: 100 INJECTION, SOLUTION PARENTERAL at 17:06

## 2018-09-15 RX ADMIN — Medication SCH EACH: at 05:37

## 2018-09-15 RX ADMIN — PIPERACILLIN SODIUM AND TAZOBACTAM SODIUM SCH MLS/HR: .375; 3 INJECTION, POWDER, LYOPHILIZED, FOR SOLUTION INTRAVENOUS at 05:44

## 2018-09-15 RX ADMIN — Medication SCH EACH: at 03:07

## 2018-09-15 RX ADMIN — Medication SCH MG: at 19:46

## 2018-09-15 RX ADMIN — INSULIN GLARGINE SCH UNIT: 100 INJECTION, SOLUTION SUBCUTANEOUS at 21:43

## 2018-09-15 RX ADMIN — SODIUM CHLORIDE SCH MLS/HR: 9 INJECTION, SOLUTION INTRAVENOUS at 09:59

## 2018-09-15 RX ADMIN — HYDROCORTISONE SODIUM SUCCINATE SCH MG: 100 INJECTION, POWDER, FOR SOLUTION INTRAMUSCULAR; INTRAVASCULAR at 12:39

## 2018-09-15 RX ADMIN — DEXTROSE MONOHYDRATE SCH MLS/HR: 50 INJECTION, SOLUTION INTRAVENOUS at 12:42

## 2018-09-15 RX ADMIN — SODIUM CHLORIDE SCH MLS/HR: 9 INJECTION, SOLUTION INTRAVENOUS at 14:05

## 2018-09-15 RX ADMIN — HYDROCORTISONE SODIUM SUCCINATE SCH MG: 100 INJECTION, POWDER, FOR SOLUTION INTRAMUSCULAR; INTRAVASCULAR at 09:09

## 2018-09-15 RX ADMIN — DEXTROSE MONOHYDRATE PRN MLS/HR: 50 INJECTION, SOLUTION INTRAVENOUS at 17:01

## 2018-09-15 RX ADMIN — PIPERACILLIN SODIUM AND TAZOBACTAM SODIUM SCH MLS/HR: .375; 3 INJECTION, POWDER, LYOPHILIZED, FOR SOLUTION INTRAVENOUS at 12:40

## 2018-09-15 RX ADMIN — ALBUTEROL SULFATE SCH MG: 2.5 SOLUTION RESPIRATORY (INHALATION) at 19:46

## 2018-09-15 RX ADMIN — LACTOBACILLUS TAB SCH EACH: TAB at 16:38

## 2018-09-15 RX ADMIN — LACTOBACILLUS TAB SCH EACH: TAB at 09:10

## 2018-09-15 RX ADMIN — INSULIN HUMAN PRN UNIT: 100 INJECTION, SOLUTION PARENTERAL at 13:02

## 2018-09-15 RX ADMIN — LACTOBACILLUS TAB SCH EACH: TAB at 13:12

## 2018-09-15 RX ADMIN — DEXTROSE MONOHYDRATE SCH MLS/HR: 50 INJECTION, SOLUTION INTRAVENOUS at 20:55

## 2018-09-15 RX ADMIN — LEVETIRACETAM SCH MG: 100 SOLUTION ORAL at 16:38

## 2018-09-15 RX ADMIN — TOPIRAMATE SCH MG: 100 TABLET, COATED ORAL at 09:11

## 2018-09-15 RX ADMIN — SODIUM CHLORIDE SCH MG: 9 INJECTION, SOLUTION INTRAVENOUS at 09:11

## 2018-09-15 RX ADMIN — DEXTROSE MONOHYDRATE PRN MLS/HR: 50 INJECTION, SOLUTION INTRAVENOUS at 12:41

## 2018-09-15 RX ADMIN — SODIUM CHLORIDE SCH MLS/HR: 9 INJECTION, SOLUTION INTRAVENOUS at 16:46

## 2018-09-15 RX ADMIN — ALBUTEROL SULFATE SCH MG: 2.5 SOLUTION RESPIRATORY (INHALATION) at 07:28

## 2018-09-15 RX ADMIN — DEXTROSE MONOHYDRATE PRN MLS/HR: 50 INJECTION, SOLUTION INTRAVENOUS at 06:44

## 2018-09-15 RX ADMIN — OXYCODONE HYDROCHLORIDE AND ACETAMINOPHEN SCH MG: 500 TABLET ORAL at 09:18

## 2018-09-15 RX ADMIN — Medication SCH EACH: at 12:56

## 2018-09-15 RX ADMIN — Medication SCH EACH: at 17:03

## 2018-09-15 RX ADMIN — ALBUTEROL SULFATE SCH MG: 2.5 SOLUTION RESPIRATORY (INHALATION) at 14:08

## 2018-09-15 RX ADMIN — SODIUM CHLORIDE SCH MG: 9 INJECTION, SOLUTION INTRAVENOUS at 16:47

## 2018-09-15 RX ADMIN — SODIUM CHLORIDE SCH MLS/HR: 9 INJECTION, SOLUTION INTRAVENOUS at 04:49

## 2018-09-15 RX ADMIN — TOPIRAMATE SCH MG: 100 TABLET, COATED ORAL at 09:10

## 2018-09-15 RX ADMIN — PIPERACILLIN SODIUM AND TAZOBACTAM SODIUM SCH MLS/HR: .375; 3 INJECTION, POWDER, LYOPHILIZED, FOR SOLUTION INTRAVENOUS at 17:02

## 2018-09-15 RX ADMIN — LEVETIRACETAM SCH MG: 100 SOLUTION ORAL at 09:10

## 2018-09-15 RX ADMIN — ATORVASTATIN CALCIUM SCH MG: 10 TABLET, FILM COATED ORAL at 09:10

## 2018-09-15 RX ADMIN — DOCUSATE SODIUM SCH MG: 50 LIQUID ORAL at 09:09

## 2018-09-15 RX ADMIN — PIPERACILLIN SODIUM AND TAZOBACTAM SODIUM SCH MLS/HR: .375; 3 INJECTION, POWDER, LYOPHILIZED, FOR SOLUTION INTRAVENOUS at 23:45

## 2018-09-15 RX ADMIN — Medication SCH MG: at 07:28

## 2018-09-15 RX ADMIN — HYDROCORTISONE SODIUM SUCCINATE SCH MG: 100 INJECTION, POWDER, FOR SOLUTION INTRAMUSCULAR; INTRAVASCULAR at 16:46

## 2018-09-15 RX ADMIN — FLUDROCORTISONE ACETATE SCH MG: 0.1 TABLET ORAL at 12:39

## 2018-09-15 RX ADMIN — FLUDROCORTISONE ACETATE SCH MG: 0.1 TABLET ORAL at 16:39

## 2018-09-15 NOTE — NUR
RN INITIAL NOTES

RECEIVED PATIENT OBTUNDED , NOT IN ACUTE DISTRESS , TOLERATING CURRENT VENT SETTINGS OF AC 
12 ,  FIO2 90% AND PEEP OF 5 WITH SPO2 % , PORTEX # 9 IN PLACE ,  ON 
BEDSIDE MONITOR , GT PATENT AND INTACT ATTACHED TO LOW INTERMITTENT SUCTION , FC DRAINING 
VIA GRAVITY WITH C/Y COLORED URINE ,  IV OF L FOOT # 22 PATENT AND INACT SL DC, W/LSIA 
MIDLINE ,  WITH NS @ 150ML/HR AND LEVOPHED @8MCG/MIN INFUSING WELL , ROSALVA POTTER IN PLACE T 
98.7, WILL CONTINUE TO MONITOR

## 2018-09-15 NOTE — NUR
ICU RN NOTES

NOTIFIED DR COTTO THAT PT GT GOT DISLODGE , NO BLEEDING NOTED , INSERTED PELAYO CATHETER , 
PLACEMENT CHECKED , ATTACHED TO LOW INTERMITTENT SUCTION , MD AWARE . PER MD HE WILL PUT NEW 
GASTRIC TUBE IN AM , CALLED [facility] [employer] FOR F 18 AND 20 GASTRIC TUBE ,

## 2018-09-15 NOTE — NUR
ICU RN NOTES

RECEIVED PATIENT OBTUNDED , NOT IN ACUTE DISTRESS , TOLERATING CURRENT VENT SETTINGS OF AC 
12 ,  FIO2 90% AND PEEP OF 5 WITH SPO2 % , PORTEX # 9 IN PLACE ,  ON 
BEDSIDE MONITOR , GT PATENT AND INTACT ATTACHED TO LOW INTERMITTENT SUCTION , FC DRAINING 
VIA GRAVITY WITH DIMITRY COLORED URINE ,  IV OF L FOOT # 22 PATENT AND INACT SL , R FOOT # 20 
WITH NS @ 150ML/HR AND LEVOPHED @6MCG/MIN INFUSING WELL , ROSALVA POTTER IN PLACE , WILL 
CONTINUE TO MONITOR

## 2018-09-15 NOTE — NUR
ICU RN NOTES

GASTRIC TUBE DISLODGED WHILE CLEANING , NO ACTIVE BLEEDING NOTED , PELAYO CATHETER INSERTED , 
BALLOON INFLATED , PLACEMENT CHECKED , PLACED ON LOW INTERMITTENT SUCTION  , DRAINING WITH 
CLEAR BROWN OUTPUT WITH NO SIGNS OF BLEEDING .

## 2018-09-15 NOTE — NUR
ICU RN. BP 70/40, PAGED HILTON MALDONADO ORDERED 2L NS BOLUS. THEN IF BP LOW START LEVOPHED. WILL 
CONTINUE TO MONITOR VITALS.

## 2018-09-15 NOTE — NUR
ICU RN. AM CARE, ORAL CARE, BED BATH GIVEN. LINEN CHANGED. REMAINING SAME VENT SETTING ON. 
SAT 95%. NO ACUTE DISTRESS NOTED. TEMPERATURE 90. ROSALVA HUGGER ON. GT INTERMITTENT  SUCTION. 
FC PATENT. IV RT FOOT 20G. IVF NS 150ML/H. HOB ELEVATED. TURN AND REPOSITION DONE. WILL 
CONTINUE TO MONITOR VITALS.

## 2018-09-15 NOTE — NUR
INITIAL

.PT  ON BED WITH TRACH#9 PROTEX AND G-TUBE NON VERBAL S/P STROKE AND QUADRIPLEGIC CURRENTLY 
DIAGNOSED WITH SEPSIS ,PNA, .PT IS OBTUNDED. VENT SETTINGS AC 12,,FIO2 90%,PEEP 5, SAT 
92%. CARDIAC MONITOR SHOWING . IV RT FOOT 20G RUNNING NS  MLS/HR GENERALIZED 
EDEMA 4 + PT IS VERY UNSTABLE. RECEIVED WITH BP 93/54,PT ON LEVOPHED AT 2 MCG'S TEMPERATURE 
93.9 ON  ROSALVA HUGGER 

PT HAS PELAYO DRAINING HEMATURIA COLORED CONTENTS. BED IN LOW POSITION LOCKED CALL ENRIQUE NEXT 
TO PT WILL CONTINUE TO MONITOR

## 2018-09-15 NOTE — NUR
ICU RN. ADMISSION.PT BEING ADMITTED IN THE ICU ROOM 258 SEPSIS ,PNA, .PT IS OBTUNDED. TRACH  
TO VENT CONNECTED. SETTINGS PORTEX#9,AC 12,,FIO2 80%,PEEP 5, SAT 98%. CARDIAC MONITOR 
SHOWING NSR. IV RT FOOT 20G. GENERALIZED EDEMA 4 + PT IS VERY UNSTABLE. RECEIVED WITH BP 
75/40,TEMPERATURE 89.  ROSALVA HUGGER INITIATED.

## 2018-09-16 VITALS — DIASTOLIC BLOOD PRESSURE: 70 MMHG | SYSTOLIC BLOOD PRESSURE: 97 MMHG

## 2018-09-16 VITALS — SYSTOLIC BLOOD PRESSURE: 93 MMHG | DIASTOLIC BLOOD PRESSURE: 60 MMHG

## 2018-09-16 VITALS — DIASTOLIC BLOOD PRESSURE: 61 MMHG | SYSTOLIC BLOOD PRESSURE: 95 MMHG

## 2018-09-16 VITALS — DIASTOLIC BLOOD PRESSURE: 63 MMHG | SYSTOLIC BLOOD PRESSURE: 101 MMHG

## 2018-09-16 VITALS — DIASTOLIC BLOOD PRESSURE: 73 MMHG | SYSTOLIC BLOOD PRESSURE: 117 MMHG

## 2018-09-16 VITALS — SYSTOLIC BLOOD PRESSURE: 101 MMHG | DIASTOLIC BLOOD PRESSURE: 74 MMHG

## 2018-09-16 VITALS — SYSTOLIC BLOOD PRESSURE: 99 MMHG | DIASTOLIC BLOOD PRESSURE: 65 MMHG

## 2018-09-16 VITALS — DIASTOLIC BLOOD PRESSURE: 61 MMHG | SYSTOLIC BLOOD PRESSURE: 100 MMHG

## 2018-09-16 VITALS — SYSTOLIC BLOOD PRESSURE: 105 MMHG | DIASTOLIC BLOOD PRESSURE: 74 MMHG

## 2018-09-16 VITALS — SYSTOLIC BLOOD PRESSURE: 96 MMHG | DIASTOLIC BLOOD PRESSURE: 67 MMHG

## 2018-09-16 VITALS — DIASTOLIC BLOOD PRESSURE: 62 MMHG | SYSTOLIC BLOOD PRESSURE: 105 MMHG

## 2018-09-16 VITALS — DIASTOLIC BLOOD PRESSURE: 63 MMHG | SYSTOLIC BLOOD PRESSURE: 97 MMHG

## 2018-09-16 VITALS — SYSTOLIC BLOOD PRESSURE: 99 MMHG | DIASTOLIC BLOOD PRESSURE: 58 MMHG

## 2018-09-16 VITALS — DIASTOLIC BLOOD PRESSURE: 62 MMHG | SYSTOLIC BLOOD PRESSURE: 103 MMHG

## 2018-09-16 VITALS — DIASTOLIC BLOOD PRESSURE: 62 MMHG | SYSTOLIC BLOOD PRESSURE: 102 MMHG

## 2018-09-16 VITALS — DIASTOLIC BLOOD PRESSURE: 59 MMHG | SYSTOLIC BLOOD PRESSURE: 94 MMHG

## 2018-09-16 VITALS — SYSTOLIC BLOOD PRESSURE: 97 MMHG | DIASTOLIC BLOOD PRESSURE: 68 MMHG

## 2018-09-16 VITALS — SYSTOLIC BLOOD PRESSURE: 100 MMHG | DIASTOLIC BLOOD PRESSURE: 64 MMHG

## 2018-09-16 VITALS — DIASTOLIC BLOOD PRESSURE: 60 MMHG | SYSTOLIC BLOOD PRESSURE: 106 MMHG

## 2018-09-16 VITALS — SYSTOLIC BLOOD PRESSURE: 103 MMHG | DIASTOLIC BLOOD PRESSURE: 63 MMHG

## 2018-09-16 VITALS — DIASTOLIC BLOOD PRESSURE: 65 MMHG | SYSTOLIC BLOOD PRESSURE: 97 MMHG

## 2018-09-16 VITALS — SYSTOLIC BLOOD PRESSURE: 98 MMHG | DIASTOLIC BLOOD PRESSURE: 65 MMHG

## 2018-09-16 VITALS — DIASTOLIC BLOOD PRESSURE: 62 MMHG | SYSTOLIC BLOOD PRESSURE: 96 MMHG

## 2018-09-16 VITALS — SYSTOLIC BLOOD PRESSURE: 94 MMHG | DIASTOLIC BLOOD PRESSURE: 70 MMHG

## 2018-09-16 VITALS — SYSTOLIC BLOOD PRESSURE: 92 MMHG | DIASTOLIC BLOOD PRESSURE: 62 MMHG

## 2018-09-16 VITALS — SYSTOLIC BLOOD PRESSURE: 104 MMHG | DIASTOLIC BLOOD PRESSURE: 77 MMHG

## 2018-09-16 VITALS — DIASTOLIC BLOOD PRESSURE: 67 MMHG | SYSTOLIC BLOOD PRESSURE: 96 MMHG

## 2018-09-16 VITALS — SYSTOLIC BLOOD PRESSURE: 95 MMHG | DIASTOLIC BLOOD PRESSURE: 60 MMHG

## 2018-09-16 VITALS — SYSTOLIC BLOOD PRESSURE: 97 MMHG | DIASTOLIC BLOOD PRESSURE: 65 MMHG

## 2018-09-16 VITALS — SYSTOLIC BLOOD PRESSURE: 105 MMHG | DIASTOLIC BLOOD PRESSURE: 61 MMHG

## 2018-09-16 VITALS — DIASTOLIC BLOOD PRESSURE: 52 MMHG | SYSTOLIC BLOOD PRESSURE: 102 MMHG

## 2018-09-16 VITALS — SYSTOLIC BLOOD PRESSURE: 100 MMHG | DIASTOLIC BLOOD PRESSURE: 73 MMHG

## 2018-09-16 VITALS — SYSTOLIC BLOOD PRESSURE: 100 MMHG | DIASTOLIC BLOOD PRESSURE: 70 MMHG

## 2018-09-16 VITALS — DIASTOLIC BLOOD PRESSURE: 60 MMHG | SYSTOLIC BLOOD PRESSURE: 102 MMHG

## 2018-09-16 VITALS — SYSTOLIC BLOOD PRESSURE: 109 MMHG | DIASTOLIC BLOOD PRESSURE: 62 MMHG

## 2018-09-16 VITALS — DIASTOLIC BLOOD PRESSURE: 57 MMHG | SYSTOLIC BLOOD PRESSURE: 94 MMHG

## 2018-09-16 VITALS — DIASTOLIC BLOOD PRESSURE: 60 MMHG | SYSTOLIC BLOOD PRESSURE: 95 MMHG

## 2018-09-16 VITALS — DIASTOLIC BLOOD PRESSURE: 64 MMHG | SYSTOLIC BLOOD PRESSURE: 96 MMHG

## 2018-09-16 VITALS — DIASTOLIC BLOOD PRESSURE: 61 MMHG | SYSTOLIC BLOOD PRESSURE: 99 MMHG

## 2018-09-16 VITALS — DIASTOLIC BLOOD PRESSURE: 65 MMHG | SYSTOLIC BLOOD PRESSURE: 94 MMHG

## 2018-09-16 VITALS — SYSTOLIC BLOOD PRESSURE: 96 MMHG | DIASTOLIC BLOOD PRESSURE: 65 MMHG

## 2018-09-16 VITALS — DIASTOLIC BLOOD PRESSURE: 68 MMHG | SYSTOLIC BLOOD PRESSURE: 97 MMHG

## 2018-09-16 VITALS — SYSTOLIC BLOOD PRESSURE: 99 MMHG | DIASTOLIC BLOOD PRESSURE: 74 MMHG

## 2018-09-16 VITALS — DIASTOLIC BLOOD PRESSURE: 71 MMHG | SYSTOLIC BLOOD PRESSURE: 98 MMHG

## 2018-09-16 VITALS — DIASTOLIC BLOOD PRESSURE: 69 MMHG | SYSTOLIC BLOOD PRESSURE: 101 MMHG

## 2018-09-16 VITALS — DIASTOLIC BLOOD PRESSURE: 68 MMHG | SYSTOLIC BLOOD PRESSURE: 93 MMHG

## 2018-09-16 VITALS — DIASTOLIC BLOOD PRESSURE: 61 MMHG | SYSTOLIC BLOOD PRESSURE: 110 MMHG

## 2018-09-16 VITALS — DIASTOLIC BLOOD PRESSURE: 65 MMHG | SYSTOLIC BLOOD PRESSURE: 96 MMHG

## 2018-09-16 VITALS — SYSTOLIC BLOOD PRESSURE: 95 MMHG | DIASTOLIC BLOOD PRESSURE: 70 MMHG

## 2018-09-16 VITALS — DIASTOLIC BLOOD PRESSURE: 67 MMHG | SYSTOLIC BLOOD PRESSURE: 95 MMHG

## 2018-09-16 VITALS — DIASTOLIC BLOOD PRESSURE: 71 MMHG | SYSTOLIC BLOOD PRESSURE: 103 MMHG

## 2018-09-16 VITALS — DIASTOLIC BLOOD PRESSURE: 60 MMHG | SYSTOLIC BLOOD PRESSURE: 94 MMHG

## 2018-09-16 VITALS — DIASTOLIC BLOOD PRESSURE: 71 MMHG | SYSTOLIC BLOOD PRESSURE: 105 MMHG

## 2018-09-16 VITALS — DIASTOLIC BLOOD PRESSURE: 77 MMHG | SYSTOLIC BLOOD PRESSURE: 101 MMHG

## 2018-09-16 VITALS — DIASTOLIC BLOOD PRESSURE: 62 MMHG | SYSTOLIC BLOOD PRESSURE: 109 MMHG

## 2018-09-16 VITALS — SYSTOLIC BLOOD PRESSURE: 103 MMHG | DIASTOLIC BLOOD PRESSURE: 68 MMHG

## 2018-09-16 VITALS — SYSTOLIC BLOOD PRESSURE: 93 MMHG | DIASTOLIC BLOOD PRESSURE: 48 MMHG

## 2018-09-16 VITALS — DIASTOLIC BLOOD PRESSURE: 62 MMHG | SYSTOLIC BLOOD PRESSURE: 98 MMHG

## 2018-09-16 VITALS — DIASTOLIC BLOOD PRESSURE: 72 MMHG | SYSTOLIC BLOOD PRESSURE: 105 MMHG

## 2018-09-16 VITALS — SYSTOLIC BLOOD PRESSURE: 107 MMHG | DIASTOLIC BLOOD PRESSURE: 75 MMHG

## 2018-09-16 VITALS — DIASTOLIC BLOOD PRESSURE: 71 MMHG | SYSTOLIC BLOOD PRESSURE: 110 MMHG

## 2018-09-16 VITALS — SYSTOLIC BLOOD PRESSURE: 110 MMHG | DIASTOLIC BLOOD PRESSURE: 61 MMHG

## 2018-09-16 VITALS — DIASTOLIC BLOOD PRESSURE: 58 MMHG | SYSTOLIC BLOOD PRESSURE: 99 MMHG

## 2018-09-16 VITALS — SYSTOLIC BLOOD PRESSURE: 98 MMHG | DIASTOLIC BLOOD PRESSURE: 73 MMHG

## 2018-09-16 VITALS — SYSTOLIC BLOOD PRESSURE: 100 MMHG | DIASTOLIC BLOOD PRESSURE: 69 MMHG

## 2018-09-16 VITALS — DIASTOLIC BLOOD PRESSURE: 73 MMHG | SYSTOLIC BLOOD PRESSURE: 98 MMHG

## 2018-09-16 VITALS — SYSTOLIC BLOOD PRESSURE: 97 MMHG | DIASTOLIC BLOOD PRESSURE: 67 MMHG

## 2018-09-16 VITALS — SYSTOLIC BLOOD PRESSURE: 100 MMHG | DIASTOLIC BLOOD PRESSURE: 57 MMHG

## 2018-09-16 VITALS — DIASTOLIC BLOOD PRESSURE: 59 MMHG | SYSTOLIC BLOOD PRESSURE: 100 MMHG

## 2018-09-16 VITALS — DIASTOLIC BLOOD PRESSURE: 68 MMHG | SYSTOLIC BLOOD PRESSURE: 102 MMHG

## 2018-09-16 VITALS — DIASTOLIC BLOOD PRESSURE: 65 MMHG | SYSTOLIC BLOOD PRESSURE: 98 MMHG

## 2018-09-16 VITALS — SYSTOLIC BLOOD PRESSURE: 102 MMHG | DIASTOLIC BLOOD PRESSURE: 71 MMHG

## 2018-09-16 VITALS — SYSTOLIC BLOOD PRESSURE: 97 MMHG | DIASTOLIC BLOOD PRESSURE: 53 MMHG

## 2018-09-16 VITALS — SYSTOLIC BLOOD PRESSURE: 100 MMHG | DIASTOLIC BLOOD PRESSURE: 74 MMHG

## 2018-09-16 VITALS — DIASTOLIC BLOOD PRESSURE: 71 MMHG | SYSTOLIC BLOOD PRESSURE: 97 MMHG

## 2018-09-16 VITALS — DIASTOLIC BLOOD PRESSURE: 64 MMHG | SYSTOLIC BLOOD PRESSURE: 97 MMHG

## 2018-09-16 VITALS — DIASTOLIC BLOOD PRESSURE: 66 MMHG | SYSTOLIC BLOOD PRESSURE: 98 MMHG

## 2018-09-16 VITALS — SYSTOLIC BLOOD PRESSURE: 106 MMHG | DIASTOLIC BLOOD PRESSURE: 59 MMHG

## 2018-09-16 VITALS — DIASTOLIC BLOOD PRESSURE: 63 MMHG | SYSTOLIC BLOOD PRESSURE: 103 MMHG

## 2018-09-16 VITALS — SYSTOLIC BLOOD PRESSURE: 96 MMHG | DIASTOLIC BLOOD PRESSURE: 62 MMHG

## 2018-09-16 VITALS — DIASTOLIC BLOOD PRESSURE: 66 MMHG | SYSTOLIC BLOOD PRESSURE: 96 MMHG

## 2018-09-16 VITALS — DIASTOLIC BLOOD PRESSURE: 75 MMHG | SYSTOLIC BLOOD PRESSURE: 104 MMHG

## 2018-09-16 VITALS — DIASTOLIC BLOOD PRESSURE: 65 MMHG | SYSTOLIC BLOOD PRESSURE: 99 MMHG

## 2018-09-16 VITALS — SYSTOLIC BLOOD PRESSURE: 102 MMHG | DIASTOLIC BLOOD PRESSURE: 62 MMHG

## 2018-09-16 LAB
ALBUMIN SERPL BCP-MCNC: 1.9 G/DL (ref 3.4–5)
ALP SERPL-CCNC: 102 U/L (ref 46–116)
ALT SERPL W P-5'-P-CCNC: 91 U/L (ref 12–78)
AST SERPL W P-5'-P-CCNC: 84 U/L (ref 15–37)
BASE EXCESS BLDA CALC-SCNC: -1 MMOL/L
BILIRUB SERPL-MCNC: 0.6 MG/DL (ref 0.2–1)
BUN SERPL-MCNC: 19 MG/DL (ref 7–18)
CALCIUM SERPL-MCNC: 8.3 MG/DL (ref 8.5–10.1)
CHLORIDE SERPL-SCNC: 111 MMOL/L (ref 98–107)
CO2 SERPL-SCNC: 29 MMOL/L (ref 21–32)
CREAT SERPL-MCNC: 1.2 MG/DL (ref 0.6–1.3)
DO-HGB MFR BLDA: 302.7 MMHG
GLUCOSE SERPL-MCNC: 135 MG/DL (ref 74–106)
HCT VFR BLD AUTO: 26 % (ref 39–51)
HGB BLD-MCNC: 8.1 G/DL (ref 13.5–17.5)
INHALED O2 CONCENTRATION: 60 %
LYMPHOCYTES NFR BLD MANUAL: 14 % (ref 16–48)
MAGNESIUM SERPL-MCNC: 3.1 MG/DL (ref 1.8–2.4)
MCHC RBC AUTO-ENTMCNC: 32 G/DL (ref 31–36)
MCV RBC AUTO: 94 FL (ref 80–96)
MONOCYTES NFR BLD MANUAL: 5 % (ref 0–11)
NEUTS BAND NFR BLD MANUAL: 44 % (ref 0–5)
NEUTS SEG NFR BLD MANUAL: 37 % (ref 42–76)
PCO2 TEMP ADJ BLDA: 38.9 MMHG (ref 35–45)
PH TEMP ADJ BLDA: 7.4 [PH] (ref 7.35–7.45)
PHOSPHATE SERPL-MCNC: 5.4 MG/DL (ref 2.5–4.9)
PLATELET # BLD AUTO: 79 /CMM (ref 150–450)
PO2 TEMP ADJ BLDA: 82.3 MMHG (ref 75–100)
POTASSIUM SERPL-SCNC: 3.5 MMOL/L (ref 3.5–5.1)
PROT SERPL-MCNC: 6.5 G/DL (ref 6.4–8.2)
RBC # BLD AUTO: 2.71 MIL/UL (ref 4.5–6)
RDW COEFFICIENT OF VARIATION: 21.5 (ref 11.5–15)
SAO2 % BLDA: 94.8 % (ref 92–98.5)
SODIUM SERPL-SCNC: 147 MMOL/L (ref 136–145)
TROPONIN I SERPL-MCNC: 0.03 NG/ML (ref 0–0.06)
VENTILATION MODE VENT: (no result)
WBC NRBC COR # BLD AUTO: 13.1 K/UL (ref 4.3–11)

## 2018-09-16 PROCEDURE — 0D20XUZ CHANGE FEEDING DEVICE IN UPPER INTESTINAL TRACT, EXTERNAL APPROACH: ICD-10-PCS | Performed by: INTERNAL MEDICINE

## 2018-09-16 RX ADMIN — DEXTROSE MONOHYDRATE PRN MLS/HR: 50 INJECTION, SOLUTION INTRAVENOUS at 07:33

## 2018-09-16 RX ADMIN — LEVETIRACETAM SCH MG: 100 SOLUTION ORAL at 17:16

## 2018-09-16 RX ADMIN — TOPIRAMATE SCH MG: 100 TABLET, COATED ORAL at 08:08

## 2018-09-16 RX ADMIN — TOPIRAMATE SCH MG: 100 TABLET, COATED ORAL at 17:15

## 2018-09-16 RX ADMIN — FLUDROCORTISONE ACETATE SCH MG: 0.1 TABLET ORAL at 11:22

## 2018-09-16 RX ADMIN — ALBUTEROL SULFATE SCH MG: 2.5 SOLUTION RESPIRATORY (INHALATION) at 13:40

## 2018-09-16 RX ADMIN — INSULIN GLARGINE SCH UNIT: 100 INJECTION, SOLUTION SUBCUTANEOUS at 21:18

## 2018-09-16 RX ADMIN — LACTOBACILLUS TAB SCH EACH: TAB at 17:15

## 2018-09-16 RX ADMIN — FLUDROCORTISONE ACETATE SCH MG: 0.1 TABLET ORAL at 17:17

## 2018-09-16 RX ADMIN — Medication SCH EACH: at 11:21

## 2018-09-16 RX ADMIN — Medication PRN ML: at 17:00

## 2018-09-16 RX ADMIN — INSULIN HUMAN PRN UNIT: 100 INJECTION, SOLUTION PARENTERAL at 05:24

## 2018-09-16 RX ADMIN — OXYCODONE HYDROCHLORIDE AND ACETAMINOPHEN SCH MG: 500 TABLET ORAL at 08:08

## 2018-09-16 RX ADMIN — ALBUTEROL SULFATE SCH MG: 2.5 SOLUTION RESPIRATORY (INHALATION) at 00:57

## 2018-09-16 RX ADMIN — LACTOBACILLUS TAB SCH EACH: TAB at 08:07

## 2018-09-16 RX ADMIN — FLUDROCORTISONE ACETATE SCH MG: 0.1 TABLET ORAL at 11:15

## 2018-09-16 RX ADMIN — Medication SCH MG: at 07:42

## 2018-09-16 RX ADMIN — SODIUM CHLORIDE PRN MLS/HR: 9 INJECTION, SOLUTION INTRAVENOUS at 16:01

## 2018-09-16 RX ADMIN — DEXTROSE MONOHYDRATE SCH MLS/HR: 50 INJECTION, SOLUTION INTRAVENOUS at 12:00

## 2018-09-16 RX ADMIN — SODIUM CHLORIDE SCH MLS/HR: 9 INJECTION, SOLUTION INTRAVENOUS at 14:49

## 2018-09-16 RX ADMIN — PIPERACILLIN SODIUM AND TAZOBACTAM SODIUM SCH MLS/HR: .375; 3 INJECTION, POWDER, LYOPHILIZED, FOR SOLUTION INTRAVENOUS at 05:06

## 2018-09-16 RX ADMIN — HYDROCORTISONE SODIUM SUCCINATE SCH MG: 100 INJECTION, POWDER, FOR SOLUTION INTRAMUSCULAR; INTRAVASCULAR at 08:08

## 2018-09-16 RX ADMIN — FLUDROCORTISONE ACETATE SCH MG: 0.1 TABLET ORAL at 00:00

## 2018-09-16 RX ADMIN — SODIUM CHLORIDE SCH MLS/HR: 9 INJECTION, SOLUTION INTRAVENOUS at 01:26

## 2018-09-16 RX ADMIN — SODIUM CHLORIDE SCH MG: 9 INJECTION, SOLUTION INTRAVENOUS at 17:15

## 2018-09-16 RX ADMIN — Medication SCH EACH: at 23:32

## 2018-09-16 RX ADMIN — LEVETIRACETAM SCH MG: 100 SOLUTION ORAL at 08:07

## 2018-09-16 RX ADMIN — ALBUTEROL SULFATE SCH MG: 2.5 SOLUTION RESPIRATORY (INHALATION) at 07:42

## 2018-09-16 RX ADMIN — INSULIN HUMAN PRN UNIT: 100 INJECTION, SOLUTION PARENTERAL at 00:33

## 2018-09-16 RX ADMIN — Medication SCH MG: at 13:40

## 2018-09-16 RX ADMIN — SODIUM CHLORIDE PRN MLS/HR: 9 INJECTION, SOLUTION INTRAVENOUS at 09:30

## 2018-09-16 RX ADMIN — PIPERACILLIN SODIUM AND TAZOBACTAM SODIUM SCH MLS/HR: .375; 3 INJECTION, POWDER, LYOPHILIZED, FOR SOLUTION INTRAVENOUS at 23:31

## 2018-09-16 RX ADMIN — HYDROCORTISONE SODIUM SUCCINATE SCH MG: 100 INJECTION, POWDER, FOR SOLUTION INTRAMUSCULAR; INTRAVASCULAR at 12:41

## 2018-09-16 RX ADMIN — FLUDROCORTISONE ACETATE SCH MG: 0.1 TABLET ORAL at 05:31

## 2018-09-16 RX ADMIN — FLUDROCORTISONE ACETATE SCH MG: 0.1 TABLET ORAL at 23:31

## 2018-09-16 RX ADMIN — DOCUSATE SODIUM SCH MG: 50 LIQUID ORAL at 08:07

## 2018-09-16 RX ADMIN — PIPERACILLIN SODIUM AND TAZOBACTAM SODIUM SCH MLS/HR: .375; 3 INJECTION, POWDER, LYOPHILIZED, FOR SOLUTION INTRAVENOUS at 11:12

## 2018-09-16 RX ADMIN — PIPERACILLIN SODIUM AND TAZOBACTAM SODIUM SCH MLS/HR: .375; 3 INJECTION, POWDER, LYOPHILIZED, FOR SOLUTION INTRAVENOUS at 17:15

## 2018-09-16 RX ADMIN — SODIUM CHLORIDE SCH MG: 9 INJECTION, SOLUTION INTRAVENOUS at 08:08

## 2018-09-16 RX ADMIN — DEXTROSE MONOHYDRATE SCH MLS/HR: 50 INJECTION, SOLUTION INTRAVENOUS at 03:56

## 2018-09-16 RX ADMIN — Medication SCH MG: at 00:57

## 2018-09-16 RX ADMIN — Medication SCH EACH: at 17:15

## 2018-09-16 RX ADMIN — HYDROCORTISONE SODIUM SUCCINATE SCH MG: 100 INJECTION, POWDER, FOR SOLUTION INTRAMUSCULAR; INTRAVASCULAR at 17:15

## 2018-09-16 RX ADMIN — ATORVASTATIN CALCIUM SCH MG: 10 TABLET, FILM COATED ORAL at 08:08

## 2018-09-16 RX ADMIN — INSULIN HUMAN PRN UNIT: 100 INJECTION, SOLUTION PARENTERAL at 17:33

## 2018-09-16 RX ADMIN — ALBUTEROL SULFATE SCH MG: 2.5 SOLUTION RESPIRATORY (INHALATION) at 19:43

## 2018-09-16 RX ADMIN — DEXTROSE MONOHYDRATE PRN MLS/HR: 50 INJECTION, SOLUTION INTRAVENOUS at 18:15

## 2018-09-16 RX ADMIN — Medication SCH EACH: at 05:05

## 2018-09-16 RX ADMIN — Medication SCH EACH: at 00:36

## 2018-09-16 RX ADMIN — Medication SCH MG: at 19:43

## 2018-09-16 RX ADMIN — SODIUM CHLORIDE SCH MLS/HR: 9 INJECTION, SOLUTION INTRAVENOUS at 07:33

## 2018-09-16 RX ADMIN — LACTOBACILLUS TAB SCH EACH: TAB at 11:21

## 2018-09-16 NOTE — NUR
RN ICU - NOTES - RECEIVED PATIENT OBTUNDED , NOT IN ACUTE DISTRESS, TOLERATING CURRENT VENT 
SETTINGS OF AC 12 ,  FIO2 60% AND PEEP OF 5 WITH SPO2 > 94% , PORTEX # 9 IN PLACE 
SUCTIONED THICK URENA SECRETIONS VIA TRACH AND ORAL, SR 80S ON BEDSIDE MONITOR, PEG TUBE 
REPLACED TODAY, PATENT AND INTACT FEEDING JEVITY @ 50 ML/HR, W 30 ML RESIDUALS (GOAL 70 
ML/HR). FC DRAINING VIA GRAVITY WITH DIMITRY COLORED URINE, LISA PICC LINE WITH  NS @ 75 ML/HR 
AND LEVOPHED @ 6 MCG/MIN INFUSING WELL , WILL CONTINUE TO MONITOR

## 2018-09-16 NOTE — NUR
ICU RN NOTES

SUPRIYA HELD FOR SEDATION VACATION , VSS , NO DISTRESS NOTED , BILATERAL SOFT WRIST 
RESTRAINS IN PLACE , SON AT BEDSIDE , WILL CONTINUE TO MONITOR . 

-------------------------------------------------------------------------------

Addendum: 09/16/18 at 0944 by SANA MENA RN

-------------------------------------------------------------------------------

WRONG PT

## 2018-09-16 NOTE — NUR
0000  COVERAGE NOT GIVEN PT NPO FOR GT REINSERTION IN AM, LANTUS 22 UNITS GIVEN FOR BS 
174, TRENDING DOWN.

## 2018-09-16 NOTE — NUR
RT

PATIENT REC'D TRACHED ON [facility] WITH ORDERED SETTINGS TRISTAN WELL. VENT ALARMS CHECKED + AUDIBLE. 
CUFF PRESSURE CHECKED MLT. AIRWAY PATENT AND SUCTIONED WITH SMALL AMT PALE SEMITHICK 
SECRETIONS.AMBU BAG AT [facility].

-------------------------------------------------------------------------------

Addendum: 09/16/18 at 1616 by ABDULLAHI DE PAZ RT

-------------------------------------------------------------------------------

Amended: Links added.

## 2018-09-16 NOTE — NUR
ICU RN NOTES

RECEIVED PATIENT OBTUNDED , NOT IN ACUTE DISTRESS , TOLERATING CURRENT VENT SETTINGS OF AC 
12 ,  FIO2 90% AND PEEP OF 5 WITH SPO2 % , PORTEX # 9 IN PLACE SUCTIONED THICK 
URENA SECRETIONS VIA TRACH AND ORAL , SR 80 ON BEDSIDE MONITOR , PELAYO CATHETER - GT  PATENT 
AND INTACT ATTACHED TO LOW INTERMITTENT SUCTION DRAINING WITH GREENISH THICK OUTPUT , FC 
DRAINING VIA GRAVITY WITH DIMITRY COLORED URINE , LISA PICC LINE WITH  NS @ 150ML/HR AND 
LEVOPHED @8MCG/MIN INFUSING WELL , WILL CONTINUE TO MONITOR

## 2018-09-16 NOTE — NUR
ICU RN NOTES

SEEN AND EVALUATED BY DR COTTO , DISCUSSED LABS , AFEBRILE , ON LEVOPHED @ 6MCG/MIN TO KEEP 
SBP ABOVE 90MMHG , NO ACTIVE BLEEDING NOTED , GT GOT DISLODGE YESTERDAY , FC INSERTED 
ATTACHED TO LOW INTERMITTENT SUCTION DRAINING WITH GREENISH OUTPUT , MD INSERTED GT , PT 
TOLERATED WELL , PER MD DALEY LOW INTERMITTENT SUCTION , OK TO USE GT AND START GT FEEDING AS 
ORDERED , OBTAIN CONSENT FOR EGD AND PLACE PT NPO POST MIDNIGHT

## 2018-09-16 NOTE — NUR
ICU CLOSING NOTE

LACTIC ACID 2.1, TRENDING DOWN, REPEAT SERIAL CONT, WILL ENDORSE TO AM RN TO F/U ON RESULTS, 
CONT NPO FOR POSSIBLE REINSERTION TODAY WILL ENDORSE TO F/U. PT KEPT CLEAN AND DRY, WELL 
REPOSITIONED.

## 2018-09-16 NOTE — NUR
0000 BLOOD SUGAR 183, NO COVERAGE PT WILL BE NPO FOR EGD IN AM, PT WAS ALREADY GIVEN LANTUS 
22 UNITS

## 2018-09-16 NOTE — NUR
ICU RN NOTES

SEEN AND EVALUATED BY DR PETIT AND CHANDRA  , DISCUSSED LABS  , CHEST XRAY, ON LEVOPHED @ 
6MCG/MIN TO KEEP SBP ABOVE 90 , AFEBRILE , NO ACTIVE BLEEDING NOTED , WITH GOOD URINE OUTPUT 
, ABDOMEN NOTED WITH DISTENTION , GT PELAYO IN PLACE ON LOW INTERMITTENT SUCTION DRAINING 
WITH GREENISH AMOUNT OF SECRETIONS  , PEG GOT DISLODGE YESTERDAY PENDING GT RE INSERTION 
UNDER DR COTTO  , PER DR PETIT IF GT GOT REINSERTED , CUT IVF TO HALF , CHANGE SSI TO Q6 MILD 
SCALE  .

## 2018-09-17 VITALS — DIASTOLIC BLOOD PRESSURE: 62 MMHG | SYSTOLIC BLOOD PRESSURE: 99 MMHG

## 2018-09-17 VITALS — DIASTOLIC BLOOD PRESSURE: 65 MMHG | SYSTOLIC BLOOD PRESSURE: 98 MMHG

## 2018-09-17 VITALS — SYSTOLIC BLOOD PRESSURE: 96 MMHG | DIASTOLIC BLOOD PRESSURE: 62 MMHG

## 2018-09-17 VITALS — SYSTOLIC BLOOD PRESSURE: 98 MMHG | DIASTOLIC BLOOD PRESSURE: 70 MMHG

## 2018-09-17 VITALS — SYSTOLIC BLOOD PRESSURE: 96 MMHG | DIASTOLIC BLOOD PRESSURE: 76 MMHG

## 2018-09-17 VITALS — DIASTOLIC BLOOD PRESSURE: 70 MMHG | SYSTOLIC BLOOD PRESSURE: 95 MMHG

## 2018-09-17 VITALS — DIASTOLIC BLOOD PRESSURE: 65 MMHG | SYSTOLIC BLOOD PRESSURE: 94 MMHG

## 2018-09-17 VITALS — DIASTOLIC BLOOD PRESSURE: 63 MMHG | SYSTOLIC BLOOD PRESSURE: 100 MMHG

## 2018-09-17 VITALS — SYSTOLIC BLOOD PRESSURE: 101 MMHG | DIASTOLIC BLOOD PRESSURE: 65 MMHG

## 2018-09-17 VITALS — DIASTOLIC BLOOD PRESSURE: 56 MMHG | SYSTOLIC BLOOD PRESSURE: 103 MMHG

## 2018-09-17 VITALS — SYSTOLIC BLOOD PRESSURE: 95 MMHG | DIASTOLIC BLOOD PRESSURE: 64 MMHG

## 2018-09-17 VITALS — DIASTOLIC BLOOD PRESSURE: 63 MMHG | SYSTOLIC BLOOD PRESSURE: 94 MMHG

## 2018-09-17 VITALS — DIASTOLIC BLOOD PRESSURE: 64 MMHG | SYSTOLIC BLOOD PRESSURE: 104 MMHG

## 2018-09-17 VITALS — DIASTOLIC BLOOD PRESSURE: 51 MMHG | SYSTOLIC BLOOD PRESSURE: 94 MMHG

## 2018-09-17 VITALS — SYSTOLIC BLOOD PRESSURE: 102 MMHG | DIASTOLIC BLOOD PRESSURE: 70 MMHG

## 2018-09-17 VITALS — DIASTOLIC BLOOD PRESSURE: 66 MMHG | SYSTOLIC BLOOD PRESSURE: 92 MMHG

## 2018-09-17 VITALS — SYSTOLIC BLOOD PRESSURE: 93 MMHG | DIASTOLIC BLOOD PRESSURE: 67 MMHG

## 2018-09-17 VITALS — DIASTOLIC BLOOD PRESSURE: 70 MMHG | SYSTOLIC BLOOD PRESSURE: 105 MMHG

## 2018-09-17 VITALS — DIASTOLIC BLOOD PRESSURE: 66 MMHG | SYSTOLIC BLOOD PRESSURE: 95 MMHG

## 2018-09-17 VITALS — SYSTOLIC BLOOD PRESSURE: 98 MMHG | DIASTOLIC BLOOD PRESSURE: 61 MMHG

## 2018-09-17 VITALS — DIASTOLIC BLOOD PRESSURE: 63 MMHG | SYSTOLIC BLOOD PRESSURE: 105 MMHG

## 2018-09-17 VITALS — SYSTOLIC BLOOD PRESSURE: 96 MMHG | DIASTOLIC BLOOD PRESSURE: 63 MMHG

## 2018-09-17 VITALS — SYSTOLIC BLOOD PRESSURE: 96 MMHG | DIASTOLIC BLOOD PRESSURE: 69 MMHG

## 2018-09-17 VITALS — SYSTOLIC BLOOD PRESSURE: 106 MMHG | DIASTOLIC BLOOD PRESSURE: 64 MMHG

## 2018-09-17 VITALS — DIASTOLIC BLOOD PRESSURE: 64 MMHG | SYSTOLIC BLOOD PRESSURE: 97 MMHG

## 2018-09-17 VITALS — SYSTOLIC BLOOD PRESSURE: 107 MMHG | DIASTOLIC BLOOD PRESSURE: 60 MMHG

## 2018-09-17 VITALS — DIASTOLIC BLOOD PRESSURE: 69 MMHG | SYSTOLIC BLOOD PRESSURE: 96 MMHG

## 2018-09-17 VITALS — SYSTOLIC BLOOD PRESSURE: 101 MMHG | DIASTOLIC BLOOD PRESSURE: 64 MMHG

## 2018-09-17 VITALS — DIASTOLIC BLOOD PRESSURE: 67 MMHG | SYSTOLIC BLOOD PRESSURE: 99 MMHG

## 2018-09-17 VITALS — DIASTOLIC BLOOD PRESSURE: 66 MMHG | SYSTOLIC BLOOD PRESSURE: 99 MMHG

## 2018-09-17 VITALS — DIASTOLIC BLOOD PRESSURE: 76 MMHG | SYSTOLIC BLOOD PRESSURE: 109 MMHG

## 2018-09-17 VITALS — SYSTOLIC BLOOD PRESSURE: 107 MMHG | DIASTOLIC BLOOD PRESSURE: 62 MMHG

## 2018-09-17 VITALS — DIASTOLIC BLOOD PRESSURE: 70 MMHG | SYSTOLIC BLOOD PRESSURE: 91 MMHG

## 2018-09-17 VITALS — DIASTOLIC BLOOD PRESSURE: 63 MMHG | SYSTOLIC BLOOD PRESSURE: 96 MMHG

## 2018-09-17 VITALS — DIASTOLIC BLOOD PRESSURE: 65 MMHG | SYSTOLIC BLOOD PRESSURE: 100 MMHG

## 2018-09-17 VITALS — SYSTOLIC BLOOD PRESSURE: 97 MMHG | DIASTOLIC BLOOD PRESSURE: 64 MMHG

## 2018-09-17 VITALS — SYSTOLIC BLOOD PRESSURE: 97 MMHG | DIASTOLIC BLOOD PRESSURE: 55 MMHG

## 2018-09-17 VITALS — DIASTOLIC BLOOD PRESSURE: 60 MMHG | SYSTOLIC BLOOD PRESSURE: 95 MMHG

## 2018-09-17 VITALS — SYSTOLIC BLOOD PRESSURE: 121 MMHG | DIASTOLIC BLOOD PRESSURE: 73 MMHG

## 2018-09-17 VITALS — SYSTOLIC BLOOD PRESSURE: 96 MMHG | DIASTOLIC BLOOD PRESSURE: 65 MMHG

## 2018-09-17 VITALS — SYSTOLIC BLOOD PRESSURE: 95 MMHG | DIASTOLIC BLOOD PRESSURE: 60 MMHG

## 2018-09-17 VITALS — DIASTOLIC BLOOD PRESSURE: 61 MMHG | SYSTOLIC BLOOD PRESSURE: 101 MMHG

## 2018-09-17 VITALS — SYSTOLIC BLOOD PRESSURE: 103 MMHG | DIASTOLIC BLOOD PRESSURE: 56 MMHG

## 2018-09-17 VITALS — SYSTOLIC BLOOD PRESSURE: 95 MMHG | DIASTOLIC BLOOD PRESSURE: 68 MMHG

## 2018-09-17 VITALS — SYSTOLIC BLOOD PRESSURE: 102 MMHG | DIASTOLIC BLOOD PRESSURE: 57 MMHG

## 2018-09-17 VITALS — SYSTOLIC BLOOD PRESSURE: 102 MMHG | DIASTOLIC BLOOD PRESSURE: 66 MMHG

## 2018-09-17 VITALS — DIASTOLIC BLOOD PRESSURE: 60 MMHG | SYSTOLIC BLOOD PRESSURE: 97 MMHG

## 2018-09-17 VITALS — DIASTOLIC BLOOD PRESSURE: 61 MMHG | SYSTOLIC BLOOD PRESSURE: 96 MMHG

## 2018-09-17 LAB
BASOPHILS # BLD AUTO: 0 /CMM (ref 0–0.2)
BASOPHILS NFR BLD AUTO: 0.2 % (ref 0–2)
BUN SERPL-MCNC: 24 MG/DL (ref 7–18)
CALCIUM SERPL-MCNC: 8.3 MG/DL (ref 8.5–10.1)
CHLORIDE SERPL-SCNC: 110 MMOL/L (ref 98–107)
CO2 SERPL-SCNC: 28 MMOL/L (ref 21–32)
CREAT SERPL-MCNC: 1.4 MG/DL (ref 0.6–1.3)
EOSINOPHIL NFR BLD AUTO: 0.1 % (ref 0–6)
GLUCOSE SERPL-MCNC: 180 MG/DL (ref 74–106)
HCT VFR BLD AUTO: 22 % (ref 39–51)
HGB BLD-MCNC: 7.3 G/DL (ref 13.5–17.5)
LYMPHOCYTES NFR BLD AUTO: 1.1 /CMM (ref 0.8–4.8)
LYMPHOCYTES NFR BLD AUTO: 8 % (ref 20–44)
LYMPHOCYTES NFR BLD MANUAL: 34 % (ref 16–48)
MAGNESIUM SERPL-MCNC: 3.1 MG/DL (ref 1.8–2.4)
MCHC RBC AUTO-ENTMCNC: 34 G/DL (ref 31–36)
MCV RBC AUTO: 92 FL (ref 80–96)
MONOCYTES NFR BLD AUTO: 0.3 /CMM (ref 0.1–1.3)
MONOCYTES NFR BLD AUTO: 1.8 % (ref 2–12)
MONOCYTES NFR BLD MANUAL: 2 % (ref 0–11)
NEUTROPHILS # BLD AUTO: 12.5 /CMM (ref 1.8–8.9)
NEUTROPHILS NFR BLD AUTO: 89.9 % (ref 43–81)
NEUTS BAND NFR BLD MANUAL: 1 % (ref 0–5)
NEUTS SEG NFR BLD MANUAL: 63 % (ref 42–76)
PHOSPHATE SERPL-MCNC: 4.4 MG/DL (ref 2.5–4.9)
PLATELET # BLD AUTO: 55 /CMM (ref 150–450)
POTASSIUM SERPL-SCNC: 3.4 MMOL/L (ref 3.5–5.1)
RBC # BLD AUTO: 2.36 MIL/UL (ref 4.5–6)
RDW COEFFICIENT OF VARIATION: 20.4 (ref 11.5–15)
SODIUM SERPL-SCNC: 141 MMOL/L (ref 136–145)
WBC NRBC COR # BLD AUTO: 13.9 K/UL (ref 4.3–11)

## 2018-09-17 PROCEDURE — 0DB58ZX EXCISION OF ESOPHAGUS, VIA NATURAL OR ARTIFICIAL OPENING ENDOSCOPIC, DIAGNOSTIC: ICD-10-PCS | Performed by: INTERNAL MEDICINE

## 2018-09-17 PROCEDURE — 0DB98ZX EXCISION OF DUODENUM, VIA NATURAL OR ARTIFICIAL OPENING ENDOSCOPIC, DIAGNOSTIC: ICD-10-PCS | Performed by: INTERNAL MEDICINE

## 2018-09-17 PROCEDURE — 0DB78ZX EXCISION OF STOMACH, PYLORUS, VIA NATURAL OR ARTIFICIAL OPENING ENDOSCOPIC, DIAGNOSTIC: ICD-10-PCS | Performed by: INTERNAL MEDICINE

## 2018-09-17 RX ADMIN — FLUDROCORTISONE ACETATE SCH MG: 0.1 TABLET ORAL at 05:41

## 2018-09-17 RX ADMIN — Medication SCH EACH: at 23:39

## 2018-09-17 RX ADMIN — Medication SCH MG: at 01:06

## 2018-09-17 RX ADMIN — TOPIRAMATE SCH MG: 100 TABLET, COATED ORAL at 18:44

## 2018-09-17 RX ADMIN — Medication SCH MG: at 13:01

## 2018-09-17 RX ADMIN — SODIUM CHLORIDE SCH MG: 9 INJECTION, SOLUTION INTRAVENOUS at 18:43

## 2018-09-17 RX ADMIN — LEVETIRACETAM SCH MG: 100 SOLUTION ORAL at 18:43

## 2018-09-17 RX ADMIN — SODIUM CHLORIDE SCH MLS/HR: 9 INJECTION, SOLUTION INTRAVENOUS at 14:23

## 2018-09-17 RX ADMIN — Medication SCH EACH: at 12:31

## 2018-09-17 RX ADMIN — HYDROCORTISONE SODIUM SUCCINATE SCH MG: 100 INJECTION, POWDER, FOR SOLUTION INTRAMUSCULAR; INTRAVASCULAR at 12:37

## 2018-09-17 RX ADMIN — OXYCODONE HYDROCHLORIDE AND ACETAMINOPHEN SCH MG: 500 TABLET ORAL at 09:00

## 2018-09-17 RX ADMIN — SUCRALFATE SCH G: 1 SUSPENSION ORAL at 12:37

## 2018-09-17 RX ADMIN — SODIUM CHLORIDE SCH MG: 9 INJECTION, SOLUTION INTRAVENOUS at 09:33

## 2018-09-17 RX ADMIN — FLUDROCORTISONE ACETATE SCH MG: 0.1 TABLET ORAL at 23:39

## 2018-09-17 RX ADMIN — Medication PRN ML: at 12:33

## 2018-09-17 RX ADMIN — INSULIN HUMAN PRN UNIT: 100 INJECTION, SOLUTION PARENTERAL at 19:00

## 2018-09-17 RX ADMIN — Medication SCH OZ: at 12:57

## 2018-09-17 RX ADMIN — Medication SCH MG: at 08:09

## 2018-09-17 RX ADMIN — LACTOBACILLUS TAB SCH EACH: TAB at 18:44

## 2018-09-17 RX ADMIN — Medication SCH MG: at 19:44

## 2018-09-17 RX ADMIN — LACTOBACILLUS TAB SCH EACH: TAB at 12:37

## 2018-09-17 RX ADMIN — SODIUM CHLORIDE PRN MLS/HR: 9 INJECTION, SOLUTION INTRAVENOUS at 05:38

## 2018-09-17 RX ADMIN — INSULIN GLARGINE SCH UNIT: 100 INJECTION, SOLUTION SUBCUTANEOUS at 21:54

## 2018-09-17 RX ADMIN — FLUDROCORTISONE ACETATE SCH MG: 0.1 TABLET ORAL at 18:44

## 2018-09-17 RX ADMIN — ATORVASTATIN CALCIUM SCH MG: 10 TABLET, FILM COATED ORAL at 09:00

## 2018-09-17 RX ADMIN — HYDROCORTISONE SODIUM SUCCINATE SCH MG: 100 INJECTION, POWDER, FOR SOLUTION INTRAMUSCULAR; INTRAVASCULAR at 18:43

## 2018-09-17 RX ADMIN — PIPERACILLIN SODIUM AND TAZOBACTAM SODIUM SCH MLS/HR: .375; 3 INJECTION, POWDER, LYOPHILIZED, FOR SOLUTION INTRAVENOUS at 05:41

## 2018-09-17 RX ADMIN — ALBUTEROL SULFATE SCH MG: 2.5 SOLUTION RESPIRATORY (INHALATION) at 19:44

## 2018-09-17 RX ADMIN — ALBUTEROL SULFATE SCH MG: 2.5 SOLUTION RESPIRATORY (INHALATION) at 01:06

## 2018-09-17 RX ADMIN — SUCRALFATE SCH G: 1 SUSPENSION ORAL at 21:53

## 2018-09-17 RX ADMIN — ALBUTEROL SULFATE SCH MG: 2.5 SOLUTION RESPIRATORY (INHALATION) at 08:09

## 2018-09-17 RX ADMIN — SUCRALFATE SCH G: 1 SUSPENSION ORAL at 18:43

## 2018-09-17 RX ADMIN — DOCUSATE SODIUM SCH MG: 50 LIQUID ORAL at 09:00

## 2018-09-17 RX ADMIN — INSULIN HUMAN PRN UNIT: 100 INJECTION, SOLUTION PARENTERAL at 12:46

## 2018-09-17 RX ADMIN — Medication SCH EACH: at 05:38

## 2018-09-17 RX ADMIN — MEROPENEM SCH MLS/HR: 1 INJECTION INTRAVENOUS at 19:06

## 2018-09-17 RX ADMIN — INSULIN HUMAN PRN UNIT: 100 INJECTION, SOLUTION PARENTERAL at 23:40

## 2018-09-17 RX ADMIN — FLUDROCORTISONE ACETATE SCH MG: 0.1 TABLET ORAL at 12:31

## 2018-09-17 RX ADMIN — Medication SCH EACH: at 18:44

## 2018-09-17 RX ADMIN — SODIUM CHLORIDE PRN MLS/HR: 9 INJECTION, SOLUTION INTRAVENOUS at 23:23

## 2018-09-17 RX ADMIN — HYDROCORTISONE SODIUM SUCCINATE SCH MG: 100 INJECTION, POWDER, FOR SOLUTION INTRAMUSCULAR; INTRAVASCULAR at 09:33

## 2018-09-17 RX ADMIN — TOPIRAMATE SCH MG: 100 TABLET, COATED ORAL at 12:30

## 2018-09-17 RX ADMIN — LACTOBACILLUS TAB SCH EACH: TAB at 09:00

## 2018-09-17 RX ADMIN — ALBUTEROL SULFATE SCH MG: 2.5 SOLUTION RESPIRATORY (INHALATION) at 13:01

## 2018-09-17 RX ADMIN — LEVETIRACETAM SCH MG: 100 SOLUTION ORAL at 12:30

## 2018-09-17 RX ADMIN — MEROPENEM SCH MLS/HR: 1 INJECTION INTRAVENOUS at 12:29

## 2018-09-17 NOTE — NUR
RT

PER DR MAHER PEEP INCREASED TO +8. TITRATE FIO2 TO MAINTAIN SPO2 ABOVE 92%

-------------------------------------------------------------------------------

Addendum: 09/17/18 at 1552 by ABDULLAHI DE PAZ RT

-------------------------------------------------------------------------------

Amended: Links added.

## 2018-09-17 NOTE — NUR
WOUND CARE CONSULT: PT PRESENTS WITH LOWER EXTREMITY DRY WOUNDS, SACRAL SCARRING AND RAISED 
SCAR TO RT DORSAL HAND, PRESENT ON ADMISSION. RECOMMEND DPM CONSULT. PT NOTED TO HAVE 4+ 
PITTING EDEMA TO BILATERAL FEET AND GENERALIZED EDEMA. RECOMMENDATIONS MADE FOR SKIN 
PROTECTION AND DISCUSSED WITH NURSING STAFF. DEFER TO DPM FOR LOWER EXTREMITIES. WILL SEE 
PRN. [facility] MD IN AGREEMENT WITH PLAN OF CARE. CURRENT 
ARNULFO SCORE IS 10. 

-------------------------------------------------------------------------------

Addendum: 09/17/18 at 1104 by SHAHNAZ MOSHER WNDNU

-------------------------------------------------------------------------------

Amended: Links added.

## 2018-09-17 NOTE — NUR
WOUND CARE CONSULT: PT RESTING AT THIS TIME. PER NURSING DOCUMENTATION, PT HAS LOWER 
EXTREMITY WOUNDS, PRESENT ON ADMISSION. PT ON [facility] ISOFLEX LOW AIRLOSS BED. ALL SKIN 
PROTECTION AND PRESSURE ULCER PREVENTION MEASURES IN PLACE AND DISCUSSED WITH NURSING STAFF. 
WILL SEE PT AS PT CONDITION PERMITS. RECOMMEND DPM CONSULT. MD IN AGREEMENT WITH PLAN OF 
CARE. PT ON ALEXUS ISOFLEX LOW AIRLOSS BED. CURRENT ARNULFO SCORE IS 10.

## 2018-09-17 NOTE — NUR
RT

PATIENT REC'D TRACHED ON [place] VENT WITH ORDERED SETTINGS TRISTAN WELL. VENT ALARMS CHECKED + 
AUDIBLE. CUFF PRESSURE CHECKED MLT. AIRWAY PATENT AND SUCTIONED WITH SMALL AMT PALE 
SEMITHICK SECRETIONS. AMBU BAG AT [facility].

-------------------------------------------------------------------------------

Addendum: 09/17/18 at 1011 by ABDULLAHI DE PAZ RT

-------------------------------------------------------------------------------

Amended: Links added.

## 2018-09-18 VITALS — SYSTOLIC BLOOD PRESSURE: 98 MMHG | DIASTOLIC BLOOD PRESSURE: 65 MMHG

## 2018-09-18 VITALS — DIASTOLIC BLOOD PRESSURE: 70 MMHG | SYSTOLIC BLOOD PRESSURE: 93 MMHG

## 2018-09-18 VITALS — DIASTOLIC BLOOD PRESSURE: 63 MMHG | SYSTOLIC BLOOD PRESSURE: 94 MMHG

## 2018-09-18 VITALS — DIASTOLIC BLOOD PRESSURE: 69 MMHG | SYSTOLIC BLOOD PRESSURE: 97 MMHG

## 2018-09-18 VITALS — DIASTOLIC BLOOD PRESSURE: 75 MMHG | SYSTOLIC BLOOD PRESSURE: 97 MMHG

## 2018-09-18 VITALS — DIASTOLIC BLOOD PRESSURE: 65 MMHG | SYSTOLIC BLOOD PRESSURE: 89 MMHG

## 2018-09-18 VITALS — DIASTOLIC BLOOD PRESSURE: 63 MMHG | SYSTOLIC BLOOD PRESSURE: 106 MMHG

## 2018-09-18 VITALS — SYSTOLIC BLOOD PRESSURE: 90 MMHG | DIASTOLIC BLOOD PRESSURE: 64 MMHG

## 2018-09-18 VITALS — DIASTOLIC BLOOD PRESSURE: 66 MMHG | SYSTOLIC BLOOD PRESSURE: 97 MMHG

## 2018-09-18 VITALS — DIASTOLIC BLOOD PRESSURE: 65 MMHG | SYSTOLIC BLOOD PRESSURE: 92 MMHG

## 2018-09-18 VITALS — SYSTOLIC BLOOD PRESSURE: 90 MMHG | DIASTOLIC BLOOD PRESSURE: 66 MMHG

## 2018-09-18 VITALS — DIASTOLIC BLOOD PRESSURE: 62 MMHG | SYSTOLIC BLOOD PRESSURE: 94 MMHG

## 2018-09-18 VITALS — DIASTOLIC BLOOD PRESSURE: 64 MMHG | SYSTOLIC BLOOD PRESSURE: 94 MMHG

## 2018-09-18 VITALS — SYSTOLIC BLOOD PRESSURE: 91 MMHG | DIASTOLIC BLOOD PRESSURE: 65 MMHG

## 2018-09-18 VITALS — DIASTOLIC BLOOD PRESSURE: 63 MMHG | SYSTOLIC BLOOD PRESSURE: 92 MMHG

## 2018-09-18 VITALS — SYSTOLIC BLOOD PRESSURE: 94 MMHG | DIASTOLIC BLOOD PRESSURE: 63 MMHG

## 2018-09-18 VITALS — DIASTOLIC BLOOD PRESSURE: 62 MMHG | SYSTOLIC BLOOD PRESSURE: 90 MMHG

## 2018-09-18 VITALS — DIASTOLIC BLOOD PRESSURE: 64 MMHG | SYSTOLIC BLOOD PRESSURE: 90 MMHG

## 2018-09-18 VITALS — SYSTOLIC BLOOD PRESSURE: 86 MMHG | DIASTOLIC BLOOD PRESSURE: 62 MMHG

## 2018-09-18 VITALS — SYSTOLIC BLOOD PRESSURE: 90 MMHG | DIASTOLIC BLOOD PRESSURE: 59 MMHG

## 2018-09-18 VITALS — DIASTOLIC BLOOD PRESSURE: 72 MMHG | SYSTOLIC BLOOD PRESSURE: 104 MMHG

## 2018-09-18 VITALS — SYSTOLIC BLOOD PRESSURE: 101 MMHG | DIASTOLIC BLOOD PRESSURE: 70 MMHG

## 2018-09-18 VITALS — SYSTOLIC BLOOD PRESSURE: 104 MMHG | DIASTOLIC BLOOD PRESSURE: 63 MMHG

## 2018-09-18 VITALS — DIASTOLIC BLOOD PRESSURE: 61 MMHG | SYSTOLIC BLOOD PRESSURE: 95 MMHG

## 2018-09-18 VITALS — DIASTOLIC BLOOD PRESSURE: 64 MMHG | SYSTOLIC BLOOD PRESSURE: 96 MMHG

## 2018-09-18 VITALS — DIASTOLIC BLOOD PRESSURE: 72 MMHG | SYSTOLIC BLOOD PRESSURE: 96 MMHG

## 2018-09-18 VITALS — DIASTOLIC BLOOD PRESSURE: 70 MMHG | SYSTOLIC BLOOD PRESSURE: 94 MMHG

## 2018-09-18 VITALS — SYSTOLIC BLOOD PRESSURE: 116 MMHG | DIASTOLIC BLOOD PRESSURE: 69 MMHG

## 2018-09-18 VITALS — SYSTOLIC BLOOD PRESSURE: 93 MMHG | DIASTOLIC BLOOD PRESSURE: 66 MMHG

## 2018-09-18 VITALS — DIASTOLIC BLOOD PRESSURE: 63 MMHG | SYSTOLIC BLOOD PRESSURE: 84 MMHG

## 2018-09-18 VITALS — DIASTOLIC BLOOD PRESSURE: 66 MMHG | SYSTOLIC BLOOD PRESSURE: 93 MMHG

## 2018-09-18 VITALS — SYSTOLIC BLOOD PRESSURE: 93 MMHG | DIASTOLIC BLOOD PRESSURE: 57 MMHG

## 2018-09-18 VITALS — SYSTOLIC BLOOD PRESSURE: 96 MMHG | DIASTOLIC BLOOD PRESSURE: 62 MMHG

## 2018-09-18 VITALS — SYSTOLIC BLOOD PRESSURE: 91 MMHG | DIASTOLIC BLOOD PRESSURE: 61 MMHG

## 2018-09-18 VITALS — SYSTOLIC BLOOD PRESSURE: 103 MMHG | DIASTOLIC BLOOD PRESSURE: 55 MMHG

## 2018-09-18 VITALS — SYSTOLIC BLOOD PRESSURE: 91 MMHG | DIASTOLIC BLOOD PRESSURE: 64 MMHG

## 2018-09-18 VITALS — DIASTOLIC BLOOD PRESSURE: 69 MMHG | SYSTOLIC BLOOD PRESSURE: 96 MMHG

## 2018-09-18 VITALS — DIASTOLIC BLOOD PRESSURE: 72 MMHG | SYSTOLIC BLOOD PRESSURE: 97 MMHG

## 2018-09-18 VITALS — DIASTOLIC BLOOD PRESSURE: 61 MMHG | SYSTOLIC BLOOD PRESSURE: 96 MMHG

## 2018-09-18 VITALS — DIASTOLIC BLOOD PRESSURE: 66 MMHG | SYSTOLIC BLOOD PRESSURE: 91 MMHG

## 2018-09-18 LAB
AFP-TM SERPL-MCNC: 4.1 NG/ML (ref 0–8.3)
APPEARANCE UR: (no result)
BASE EXCESS BLDA CALC-SCNC: -1.9 MMOL/L
BASOPHILS # BLD AUTO: 0.1 /CMM (ref 0–0.2)
BASOPHILS NFR BLD AUTO: 0.4 % (ref 0–2)
BILIRUB UR QL STRIP: NEGATIVE
BUN SERPL-MCNC: 33 MG/DL (ref 7–18)
CALCIUM SERPL-MCNC: 8.5 MG/DL (ref 8.5–10.1)
CANCER AG19-9 SERPL-ACNC: 1 U/ML (ref 0–35)
CHLORIDE SERPL-SCNC: 114 MMOL/L (ref 98–107)
CO2 SERPL-SCNC: 27 MMOL/L (ref 21–32)
COLOR UR: YELLOW
CREAT SERPL-MCNC: 1.5 MG/DL (ref 0.6–1.3)
CREAT UR-MCNC: 84.5 MG/DL (ref 30–125)
DO-HGB MFR BLDA: 236.9 MMHG
EOSINOPHIL NFR BLD AUTO: 0 % (ref 0–6)
GLUCOSE SERPL-MCNC: 129 MG/DL (ref 74–106)
GLUCOSE UR STRIP-MCNC: NEGATIVE MG/DL
HCT VFR BLD AUTO: 22 % (ref 39–51)
HGB BLD-MCNC: 7 G/DL (ref 13.5–17.5)
HGB UR QL STRIP: (no result) ERY/UL
INHALED O2 CONCENTRATION: 50 %
INTRINSIC PEEP RESPIRATORY: 8 CM H2O
KETONES UR STRIP-MCNC: NEGATIVE MG/DL
LEUKOCYTE ESTERASE UR QL STRIP: NEGATIVE
LYMPHOCYTES NFR BLD AUTO: 1.2 /CMM (ref 0.8–4.8)
LYMPHOCYTES NFR BLD AUTO: 8.5 % (ref 20–44)
LYMPHOCYTES NFR BLD MANUAL: 5 % (ref 16–48)
MCHC RBC AUTO-ENTMCNC: 32 G/DL (ref 31–36)
MCV RBC AUTO: 93 FL (ref 80–96)
MONOCYTES NFR BLD AUTO: 0.3 /CMM (ref 0.1–1.3)
MONOCYTES NFR BLD AUTO: 2.2 % (ref 2–12)
MONOCYTES NFR BLD MANUAL: 2 % (ref 0–11)
NEUTROPHILS # BLD AUTO: 12.8 /CMM (ref 1.8–8.9)
NEUTROPHILS NFR BLD AUTO: 88.9 % (ref 43–81)
NEUTS BAND NFR BLD MANUAL: 10 % (ref 0–5)
NEUTS SEG NFR BLD MANUAL: 83 % (ref 42–76)
NITRITE UR QL STRIP: NEGATIVE
OSMOLALITY UR: 525 MOS/KG (ref 340–1090)
PCO2 TEMP ADJ BLDA: 37.6 MMHG (ref 35–45)
PEEP SETTING VENT: 500 ML
PH TEMP ADJ BLDA: 7.4 [PH] (ref 7.35–7.45)
PH UR STRIP: 6 [PH] (ref 5–8)
PLATELET # BLD AUTO: 62 /CMM (ref 150–450)
PO2 TEMP ADJ BLDA: 77.3 MMHG (ref 75–100)
POTASSIUM SERPL-SCNC: 3.7 MMOL/L (ref 3.5–5.1)
PROT UR QL STRIP: NEGATIVE MG/DL
PROT UR-MCNC: 27.9 MG/DL (ref 0–11.9)
RBC # BLD AUTO: 2.33 MIL/UL (ref 4.5–6)
RBC #/AREA URNS HPF: (no result) /HPF (ref 0–2)
RDW COEFFICIENT OF VARIATION: 20.4 (ref 11.5–15)
SAO2 % BLDA: 93.4 % (ref 92–98.5)
SET RATE, BG: 12
SODIUM SERPL-SCNC: 150 MMOL/L (ref 136–145)
SODIUM UR-SCNC: < 5 MMOL/L (ref 40–220)
UROBILINOGEN UR STRIP-MCNC: 0.2 EU/DL
WBC #/AREA URNS HPF: (no result) /HPF (ref 0–3)
WBC NRBC COR # BLD AUTO: 14.4 K/UL (ref 4.3–11)

## 2018-09-18 PROCEDURE — 30233N1 TRANSFUSION OF NONAUTOLOGOUS RED BLOOD CELLS INTO PERIPHERAL VEIN, PERCUTANEOUS APPROACH: ICD-10-PCS | Performed by: NURSE PRACTITIONER

## 2018-09-18 RX ADMIN — SUCRALFATE SCH G: 1 SUSPENSION ORAL at 17:41

## 2018-09-18 RX ADMIN — Medication SCH MG: at 14:27

## 2018-09-18 RX ADMIN — ALBUTEROL SULFATE SCH MG: 2.5 SOLUTION RESPIRATORY (INHALATION) at 00:57

## 2018-09-18 RX ADMIN — Medication SCH MG: at 07:30

## 2018-09-18 RX ADMIN — TOPIRAMATE SCH MG: 100 TABLET, COATED ORAL at 08:19

## 2018-09-18 RX ADMIN — Medication SCH EACH: at 17:50

## 2018-09-18 RX ADMIN — DEXTROSE MONOHYDRATE SCH MLS/HR: 50 INJECTION, SOLUTION INTRAVENOUS at 20:29

## 2018-09-18 RX ADMIN — SUCRALFATE SCH G: 1 SUSPENSION ORAL at 11:18

## 2018-09-18 RX ADMIN — Medication SCH EACH: at 11:36

## 2018-09-18 RX ADMIN — ALBUTEROL SULFATE SCH MG: 2.5 SOLUTION RESPIRATORY (INHALATION) at 07:30

## 2018-09-18 RX ADMIN — INSULIN HUMAN PRN UNIT: 100 INJECTION, SOLUTION PARENTERAL at 17:52

## 2018-09-18 RX ADMIN — SUCRALFATE SCH G: 1 SUSPENSION ORAL at 08:08

## 2018-09-18 RX ADMIN — LEVETIRACETAM SCH MG: 100 SOLUTION ORAL at 17:41

## 2018-09-18 RX ADMIN — LACTOBACILLUS TAB SCH EACH: TAB at 17:41

## 2018-09-18 RX ADMIN — Medication SCH EACH: at 05:59

## 2018-09-18 RX ADMIN — Medication SCH OZ: at 08:20

## 2018-09-18 RX ADMIN — LACTOBACILLUS TAB SCH EACH: TAB at 13:02

## 2018-09-18 RX ADMIN — SODIUM CHLORIDE SCH MG: 9 INJECTION, SOLUTION INTRAVENOUS at 08:19

## 2018-09-18 RX ADMIN — FLUDROCORTISONE ACETATE SCH MG: 0.1 TABLET ORAL at 05:59

## 2018-09-18 RX ADMIN — HYDROCORTISONE SODIUM SUCCINATE SCH MG: 100 INJECTION, POWDER, FOR SOLUTION INTRAMUSCULAR; INTRAVASCULAR at 17:42

## 2018-09-18 RX ADMIN — LEVETIRACETAM SCH MG: 100 SOLUTION ORAL at 08:19

## 2018-09-18 RX ADMIN — TOPIRAMATE SCH MG: 100 TABLET, COATED ORAL at 17:42

## 2018-09-18 RX ADMIN — Medication SCH MG: at 20:02

## 2018-09-18 RX ADMIN — ALBUTEROL SULFATE SCH MG: 2.5 SOLUTION RESPIRATORY (INHALATION) at 20:02

## 2018-09-18 RX ADMIN — SODIUM CHLORIDE SCH MG: 9 INJECTION, SOLUTION INTRAVENOUS at 17:41

## 2018-09-18 RX ADMIN — MEROPENEM SCH MLS/HR: 1 INJECTION INTRAVENOUS at 11:18

## 2018-09-18 RX ADMIN — ATORVASTATIN CALCIUM SCH MG: 10 TABLET, FILM COATED ORAL at 08:19

## 2018-09-18 RX ADMIN — HYDROCORTISONE SODIUM SUCCINATE SCH MG: 100 INJECTION, POWDER, FOR SOLUTION INTRAMUSCULAR; INTRAVASCULAR at 08:19

## 2018-09-18 RX ADMIN — MEROPENEM SCH MLS/HR: 1 INJECTION INTRAVENOUS at 02:44

## 2018-09-18 RX ADMIN — OXYCODONE HYDROCHLORIDE AND ACETAMINOPHEN SCH MG: 500 TABLET ORAL at 08:19

## 2018-09-18 RX ADMIN — INSULIN GLARGINE SCH UNIT: 100 INJECTION, SOLUTION SUBCUTANEOUS at 21:38

## 2018-09-18 RX ADMIN — DEXTROSE AND SODIUM CHLORIDE PRN MLS/HR: 5; 450 INJECTION, SOLUTION INTRAVENOUS at 11:18

## 2018-09-18 RX ADMIN — INSULIN HUMAN PRN UNIT: 100 INJECTION, SOLUTION PARENTERAL at 11:49

## 2018-09-18 RX ADMIN — FLUDROCORTISONE ACETATE SCH MG: 0.1 TABLET ORAL at 11:19

## 2018-09-18 RX ADMIN — LACTOBACILLUS TAB SCH EACH: TAB at 08:19

## 2018-09-18 RX ADMIN — DOCUSATE SODIUM SCH MG: 50 LIQUID ORAL at 08:19

## 2018-09-18 RX ADMIN — SODIUM CHLORIDE SCH MLS/HR: 9 INJECTION, SOLUTION INTRAVENOUS at 14:47

## 2018-09-18 RX ADMIN — Medication PRN ML: at 19:00

## 2018-09-18 RX ADMIN — FLUDROCORTISONE ACETATE SCH MG: 0.1 TABLET ORAL at 17:42

## 2018-09-18 RX ADMIN — SUCRALFATE SCH G: 1 SUSPENSION ORAL at 21:06

## 2018-09-18 RX ADMIN — Medication SCH MG: at 00:56

## 2018-09-18 RX ADMIN — ALBUTEROL SULFATE SCH MG: 2.5 SOLUTION RESPIRATORY (INHALATION) at 14:27

## 2018-09-18 RX ADMIN — HYDROCORTISONE SODIUM SUCCINATE SCH MG: 100 INJECTION, POWDER, FOR SOLUTION INTRAMUSCULAR; INTRAVASCULAR at 13:02

## 2018-09-18 NOTE — NUR
RT NOTE:

PT. 58 Y OLD MALE REC. @ 0700 AM NON RESPONSIVE. 

TRACHE'D PORTEX # 9 ON [facility]. VENT WITH NOTED SETTINGS, ALARMS ARE SET AND FUNCTIONAL, B/S 
RHONCHI. CARROLL'X FOR MOD. AMT. OF SECRETIONS. THICK WHIT/YELLOW TRACH CARE DONE. HME CHANGED 
EQUAL CHEST RISE NOTED. INLINE TX'S GIVEN NO ADVERSE REACTION NOTED. TRISTAN. WELL AND REMAIN ON 
SAME SETTINGS, POST ABG PER DR. MAHER.

VENT PLUGGED INTO RED OUT LET. CONTINUE FOR CARE AND MONITOR. AMBU BAG REMAIN AT THE 
BEDSIDE,.

REPORT WILL PASS TO PM SHIFT.

-------------------------------------------------------------------------------

Addendum: 09/18/18 at 1757 by EDWINA ALVARADO RT

-------------------------------------------------------------------------------

Amended: Links added.

## 2018-09-18 NOTE — NUR
ICU RN NOTE



GT RESIDUALS RECHECKED WITH ONLY 30CC OUTPUT. WILL RESTART FEEDING AT 35CC/HR. ON ASPIRATION 
PRECAUTIONS. WILL MONITOR.

## 2018-09-18 NOTE — NUR
ICU RN NOTE



CHECKED GTUBE WITH 210CC RESIDUALS NOTED. FEEDING REMAINS CLAMPED AT THIS TIME. HOB 
ELEVATED. WILL CONTINUE TO MONITOR.

## 2018-09-18 NOTE — NUR
ICU RN NOTE



RECEIVED PT ON MECH VENT WITH SETTINGS WELL TOLERATED AT THIS TIME. OBTUNDED WITH EYES OPEN. 
HOB ELEVATED AND ON ASPIRATION PRECAUTIONS. GT FEEDING NOT TOLERATED AT THIS TIME WITH 180CC 
RESIDUAL AND FEEDING ON HOLD. PELAYO CATHETER IN PLACE AND DRAINING BY GRAVITY. IV LISA PICC 
CLEAN AND DRY WITH FLUIDS INFUSING. WILL CONTINUE TO MONITOR.

## 2018-09-19 VITALS — DIASTOLIC BLOOD PRESSURE: 72 MMHG | SYSTOLIC BLOOD PRESSURE: 101 MMHG

## 2018-09-19 VITALS — DIASTOLIC BLOOD PRESSURE: 76 MMHG | SYSTOLIC BLOOD PRESSURE: 120 MMHG

## 2018-09-19 VITALS — DIASTOLIC BLOOD PRESSURE: 68 MMHG | SYSTOLIC BLOOD PRESSURE: 113 MMHG

## 2018-09-19 VITALS — SYSTOLIC BLOOD PRESSURE: 96 MMHG | DIASTOLIC BLOOD PRESSURE: 59 MMHG

## 2018-09-19 VITALS — DIASTOLIC BLOOD PRESSURE: 67 MMHG | SYSTOLIC BLOOD PRESSURE: 99 MMHG

## 2018-09-19 VITALS — SYSTOLIC BLOOD PRESSURE: 115 MMHG | DIASTOLIC BLOOD PRESSURE: 70 MMHG

## 2018-09-19 VITALS — SYSTOLIC BLOOD PRESSURE: 120 MMHG | DIASTOLIC BLOOD PRESSURE: 76 MMHG

## 2018-09-19 VITALS — DIASTOLIC BLOOD PRESSURE: 77 MMHG | SYSTOLIC BLOOD PRESSURE: 105 MMHG

## 2018-09-19 LAB
BASOPHILS # BLD AUTO: 0 /CMM (ref 0–0.2)
BASOPHILS NFR BLD AUTO: 0.1 % (ref 0–2)
BUN SERPL-MCNC: 40 MG/DL (ref 7–18)
CALCIUM SERPL-MCNC: 8.7 MG/DL (ref 8.5–10.1)
CHLORIDE SERPL-SCNC: 117 MMOL/L (ref 98–107)
CO2 SERPL-SCNC: 26 MMOL/L (ref 21–32)
CREAT SERPL-MCNC: 1.3 MG/DL (ref 0.6–1.3)
EOSINOPHIL NFR BLD AUTO: 0.1 % (ref 0–6)
GLUCOSE SERPL-MCNC: 175 MG/DL (ref 74–106)
HCT VFR BLD AUTO: 26 % (ref 39–51)
HEMOCCULT STL QL: NEGATIVE
HGB BLD-MCNC: 8.6 G/DL (ref 13.5–17.5)
LYMPHOCYTES NFR BLD AUTO: 18.7 % (ref 20–44)
LYMPHOCYTES NFR BLD AUTO: 2.1 /CMM (ref 0.8–4.8)
LYMPHOCYTES NFR BLD MANUAL: 18 % (ref 16–48)
MAGNESIUM SERPL-MCNC: 3.3 MG/DL (ref 1.8–2.4)
MCHC RBC AUTO-ENTMCNC: 33 G/DL (ref 31–36)
MCV RBC AUTO: 93 FL (ref 80–96)
MONOCYTES NFR BLD AUTO: 0.6 /CMM (ref 0.1–1.3)
MONOCYTES NFR BLD AUTO: 5.5 % (ref 2–12)
MONOCYTES NFR BLD MANUAL: 4 % (ref 0–11)
NEUTROPHILS # BLD AUTO: 8.5 /CMM (ref 1.8–8.9)
NEUTROPHILS NFR BLD AUTO: 75.6 % (ref 43–81)
NEUTS SEG NFR BLD MANUAL: 78 % (ref 42–76)
PHOSPHATE SERPL-MCNC: 3.9 MG/DL (ref 2.5–4.9)
PLATELET # BLD AUTO: 80 /CMM (ref 150–450)
POTASSIUM SERPL-SCNC: 3.8 MMOL/L (ref 3.5–5.1)
RBC # BLD AUTO: 2.83 MIL/UL (ref 4.5–6)
RDW COEFFICIENT OF VARIATION: 19.5 (ref 11.5–15)
SODIUM SERPL-SCNC: 154 MMOL/L (ref 136–145)
TSH SERPL DL<=0.005 MIU/L-ACNC: 4.49 UIU/ML (ref 0.36–3.74)
URATE SERPL-MCNC: 7.8 MG/DL (ref 2.6–7.2)
WBC NRBC COR # BLD AUTO: 11.2 K/UL (ref 4.3–11)

## 2018-09-19 RX ADMIN — Medication SCH EACH: at 23:16

## 2018-09-19 RX ADMIN — SUCRALFATE SCH G: 1 SUSPENSION ORAL at 08:34

## 2018-09-19 RX ADMIN — Medication SCH EACH: at 05:37

## 2018-09-19 RX ADMIN — FLUDROCORTISONE ACETATE SCH MG: 0.1 TABLET ORAL at 18:00

## 2018-09-19 RX ADMIN — HYDROCORTISONE SODIUM SUCCINATE SCH MG: 100 INJECTION, POWDER, FOR SOLUTION INTRAMUSCULAR; INTRAVASCULAR at 13:21

## 2018-09-19 RX ADMIN — INSULIN HUMAN PRN UNIT: 100 INJECTION, SOLUTION PARENTERAL at 23:17

## 2018-09-19 RX ADMIN — SODIUM CHLORIDE SCH MG: 9 INJECTION, SOLUTION INTRAVENOUS at 08:34

## 2018-09-19 RX ADMIN — ALBUTEROL SULFATE SCH MG: 2.5 SOLUTION RESPIRATORY (INHALATION) at 19:28

## 2018-09-19 RX ADMIN — DEXTROSE MONOHYDRATE SCH MLS/HR: 50 INJECTION, SOLUTION INTRAVENOUS at 23:02

## 2018-09-19 RX ADMIN — INSULIN HUMAN PRN UNIT: 100 INJECTION, SOLUTION PARENTERAL at 00:18

## 2018-09-19 RX ADMIN — INSULIN HUMAN PRN UNIT: 100 INJECTION, SOLUTION PARENTERAL at 05:44

## 2018-09-19 RX ADMIN — ALBUTEROL SULFATE SCH MG: 2.5 SOLUTION RESPIRATORY (INHALATION) at 13:47

## 2018-09-19 RX ADMIN — INSULIN HUMAN PRN UNIT: 100 INJECTION, SOLUTION PARENTERAL at 18:46

## 2018-09-19 RX ADMIN — TOPIRAMATE SCH MG: 100 TABLET, COATED ORAL at 08:35

## 2018-09-19 RX ADMIN — FLUDROCORTISONE ACETATE SCH MG: 0.1 TABLET ORAL at 05:31

## 2018-09-19 RX ADMIN — Medication SCH EACH: at 18:54

## 2018-09-19 RX ADMIN — DEXTROSE AND SODIUM CHLORIDE PRN MLS/HR: 5; 450 INJECTION, SOLUTION INTRAVENOUS at 05:27

## 2018-09-19 RX ADMIN — Medication SCH EACH: at 12:20

## 2018-09-19 RX ADMIN — ALBUTEROL SULFATE SCH MG: 2.5 SOLUTION RESPIRATORY (INHALATION) at 07:29

## 2018-09-19 RX ADMIN — FLUDROCORTISONE ACETATE SCH MG: 0.1 TABLET ORAL at 11:50

## 2018-09-19 RX ADMIN — SODIUM CHLORIDE SCH MG: 9 INJECTION, SOLUTION INTRAVENOUS at 16:51

## 2018-09-19 RX ADMIN — INSULIN GLARGINE SCH UNIT: 100 INJECTION, SOLUTION SUBCUTANEOUS at 23:15

## 2018-09-19 RX ADMIN — DEXTROSE AND SODIUM CHLORIDE PRN MLS/HR: 5; 450 INJECTION, SOLUTION INTRAVENOUS at 16:40

## 2018-09-19 RX ADMIN — TOPIRAMATE SCH MG: 100 TABLET, COATED ORAL at 16:51

## 2018-09-19 RX ADMIN — SUCRALFATE SCH G: 1 SUSPENSION ORAL at 11:49

## 2018-09-19 RX ADMIN — Medication SCH MG: at 13:47

## 2018-09-19 RX ADMIN — SODIUM CHLORIDE SCH MLS/HR: 9 INJECTION, SOLUTION INTRAVENOUS at 16:39

## 2018-09-19 RX ADMIN — INSULIN HUMAN PRN UNIT: 100 INJECTION, SOLUTION PARENTERAL at 12:19

## 2018-09-19 RX ADMIN — SUCRALFATE SCH G: 1 SUSPENSION ORAL at 16:51

## 2018-09-19 RX ADMIN — LEVETIRACETAM SCH MG: 100 SOLUTION ORAL at 08:34

## 2018-09-19 RX ADMIN — Medication SCH MG: at 19:28

## 2018-09-19 RX ADMIN — SUCRALFATE SCH G: 1 SUSPENSION ORAL at 23:02

## 2018-09-19 RX ADMIN — LACTOBACILLUS TAB SCH EACH: TAB at 13:21

## 2018-09-19 RX ADMIN — DOCUSATE SODIUM SCH MG: 50 LIQUID ORAL at 08:34

## 2018-09-19 RX ADMIN — Medication SCH EACH: at 00:01

## 2018-09-19 RX ADMIN — LEVETIRACETAM SCH MG: 100 SOLUTION ORAL at 16:51

## 2018-09-19 RX ADMIN — LACTOBACILLUS TAB SCH EACH: TAB at 08:34

## 2018-09-19 RX ADMIN — ATORVASTATIN CALCIUM SCH MG: 10 TABLET, FILM COATED ORAL at 08:34

## 2018-09-19 RX ADMIN — Medication SCH OZ: at 08:37

## 2018-09-19 RX ADMIN — Medication SCH MG: at 00:56

## 2018-09-19 RX ADMIN — Medication SCH MG: at 07:29

## 2018-09-19 RX ADMIN — FLUDROCORTISONE ACETATE SCH MG: 0.1 TABLET ORAL at 00:01

## 2018-09-19 RX ADMIN — ALBUTEROL SULFATE SCH MG: 2.5 SOLUTION RESPIRATORY (INHALATION) at 00:56

## 2018-09-19 RX ADMIN — OXYCODONE HYDROCHLORIDE AND ACETAMINOPHEN SCH MG: 500 TABLET ORAL at 08:34

## 2018-09-19 RX ADMIN — FLUDROCORTISONE ACETATE SCH MG: 0.1 TABLET ORAL at 23:11

## 2018-09-19 RX ADMIN — HYDROCORTISONE SODIUM SUCCINATE SCH MG: 100 INJECTION, POWDER, FOR SOLUTION INTRAMUSCULAR; INTRAVASCULAR at 16:51

## 2018-09-19 RX ADMIN — HYDROCORTISONE SODIUM SUCCINATE SCH MG: 100 INJECTION, POWDER, FOR SOLUTION INTRAMUSCULAR; INTRAVASCULAR at 08:34

## 2018-09-19 RX ADMIN — LACTOBACILLUS TAB SCH EACH: TAB at 16:51

## 2018-09-19 NOTE — NUR
TELE/RN NOTES:



RECEIVED PT. VIA BED W/ ACLS PROTOCOL W/ ICU NURSE CARMELA. HOB ELEVATED. OBTUNDED. MECH. 
VENT. TOLERATING VENT SETTINGS WELL. ON TELE MONITOR SR. W/ GTF TOLERATING WELL. W/ IVF 
TOLERATING WELL. W/ GENERALIZED EDEMA PITTING + 2 ALL OVER. W/ F/C DRAINING VIA GRAVITY. NO 
FACIAL GRIMACES OR MOANING NOTED. WILL CONTINUE TO MONITOR. REPORT GIVEN TO NEXT SHIFT NURSE 
FOR [facility].

## 2018-09-19 NOTE — NUR
ICU RN NOTE



PT TRANSFERRED SAFELY TO ROOM 106 WITH RT AT BEDSIDE. GAVE REPORT TO ROSALINDA RAMÍREZ FOR 
CONTINUITY OF CARE.

## 2018-09-19 NOTE — NUR
RECEIVED PT TRACHED PTX 9 ON VENT. NO RESP DISTRESS NOTED. PT TOLERATING VENT SETTINGS. SX'D 
FOR MOD AMT OF THICK WHITE SECRETIONS. VENT ALARMS SET AND AUDIBLE. AMBU BAG AT BEDSIDE. 
VENT PLUGGED INTO RED OUTLET. WILL CONTINUE TO MONITOR.

-------------------------------------------------------------------------------

Addendum: 09/19/18 at 1932 by RAYNA GONSALVES RT

-------------------------------------------------------------------------------

Amended: Links added.

## 2018-09-19 NOTE — NUR
RT



RECEIVED PT TRACH VENT DEPENDENT WITH NOTED SETTINGS, MLT DONE AND TRACH IS SECURE. VENT 
ALARMS CHECKED AND AUDIBLE. VENT IS PLUGGED IN RED OUTLET. SX WITH MOD THK WHITE/CLEAR  W/ 
BLOOD TINGED SECRETIONS, BREATHING TX GIVEN AND PT TOLERATING SETTINGS WELL. AMBU BAG NOTED 
HOB. PT ON CONTINUOUS PULSE OX. NO SOB OR RESP DISTRESS NOTED, WILL CONTINUE TO MONITOR T/O 
SHIFT.

## 2018-09-19 NOTE — NUR
PER MD, TITRATE FIO2 AND PEEP AS ABLE. PT TITRATED TO 40% FIO2 AND PEEP OF 5. RN NOTIFIED. 
WILL CONTINUE TO MONITOR.

## 2018-09-20 VITALS — SYSTOLIC BLOOD PRESSURE: 119 MMHG | DIASTOLIC BLOOD PRESSURE: 63 MMHG

## 2018-09-20 VITALS — DIASTOLIC BLOOD PRESSURE: 75 MMHG | SYSTOLIC BLOOD PRESSURE: 111 MMHG

## 2018-09-20 VITALS — DIASTOLIC BLOOD PRESSURE: 84 MMHG | SYSTOLIC BLOOD PRESSURE: 119 MMHG

## 2018-09-20 VITALS — SYSTOLIC BLOOD PRESSURE: 134 MMHG | DIASTOLIC BLOOD PRESSURE: 82 MMHG

## 2018-09-20 VITALS — SYSTOLIC BLOOD PRESSURE: 116 MMHG | DIASTOLIC BLOOD PRESSURE: 68 MMHG

## 2018-09-20 VITALS — DIASTOLIC BLOOD PRESSURE: 78 MMHG | SYSTOLIC BLOOD PRESSURE: 124 MMHG

## 2018-09-20 LAB
BASOPHILS # BLD AUTO: 0 /CMM (ref 0–0.2)
BASOPHILS NFR BLD AUTO: 0.1 % (ref 0–2)
BUN SERPL-MCNC: 40 MG/DL (ref 7–18)
CALCIUM SERPL-MCNC: 8.2 MG/DL (ref 8.5–10.1)
CHLORIDE SERPL-SCNC: 116 MMOL/L (ref 98–107)
CO2 SERPL-SCNC: 26 MMOL/L (ref 21–32)
CREAT SERPL-MCNC: 1.2 MG/DL (ref 0.6–1.3)
EOSINOPHIL NFR BLD AUTO: 0.2 % (ref 0–6)
GLUCOSE SERPL-MCNC: 178 MG/DL (ref 74–106)
HCT VFR BLD AUTO: 26 % (ref 39–51)
HGB BLD-MCNC: 8.3 G/DL (ref 13.5–17.5)
LYMPHOCYTES NFR BLD AUTO: 1.9 /CMM (ref 0.8–4.8)
LYMPHOCYTES NFR BLD AUTO: 15.3 % (ref 20–44)
MAGNESIUM SERPL-MCNC: 3.1 MG/DL (ref 1.8–2.4)
MCHC RBC AUTO-ENTMCNC: 31 G/DL (ref 31–36)
MCV RBC AUTO: 95 FL (ref 80–96)
MONOCYTES NFR BLD AUTO: 0.7 /CMM (ref 0.1–1.3)
MONOCYTES NFR BLD AUTO: 5.8 % (ref 2–12)
NEUTROPHILS # BLD AUTO: 9.5 /CMM (ref 1.8–8.9)
NEUTROPHILS NFR BLD AUTO: 78.6 % (ref 43–81)
PHOSPHATE SERPL-MCNC: 3.3 MG/DL (ref 2.5–4.9)
PLATELET # BLD AUTO: 109 /CMM (ref 150–450)
POTASSIUM SERPL-SCNC: 3.5 MMOL/L (ref 3.5–5.1)
RBC # BLD AUTO: 2.77 MIL/UL (ref 4.5–6)
RDW COEFFICIENT OF VARIATION: 20.7 (ref 11.5–15)
SODIUM SERPL-SCNC: 153 MMOL/L (ref 136–145)
WBC NRBC COR # BLD AUTO: 12.1 K/UL (ref 4.3–11)

## 2018-09-20 RX ADMIN — Medication SCH EACH: at 13:45

## 2018-09-20 RX ADMIN — Medication SCH OZ: at 08:57

## 2018-09-20 RX ADMIN — INSULIN GLARGINE SCH UNIT: 100 INJECTION, SOLUTION SUBCUTANEOUS at 21:17

## 2018-09-20 RX ADMIN — DEXTROSE AND SODIUM CHLORIDE PRN MLS/HR: 5; 450 INJECTION, SOLUTION INTRAVENOUS at 06:09

## 2018-09-20 RX ADMIN — DOCUSATE SODIUM SCH MG: 50 LIQUID ORAL at 08:31

## 2018-09-20 RX ADMIN — SUCRALFATE SCH G: 1 SUSPENSION ORAL at 21:15

## 2018-09-20 RX ADMIN — ALBUTEROL SULFATE SCH MG: 2.5 SOLUTION RESPIRATORY (INHALATION) at 19:51

## 2018-09-20 RX ADMIN — SUCRALFATE SCH G: 1 SUSPENSION ORAL at 08:30

## 2018-09-20 RX ADMIN — Medication SCH MG: at 07:32

## 2018-09-20 RX ADMIN — SUCRALFATE SCH G: 1 SUSPENSION ORAL at 17:02

## 2018-09-20 RX ADMIN — FLUDROCORTISONE ACETATE SCH MG: 0.1 TABLET ORAL at 06:04

## 2018-09-20 RX ADMIN — ATORVASTATIN CALCIUM SCH MG: 10 TABLET, FILM COATED ORAL at 08:36

## 2018-09-20 RX ADMIN — LEVETIRACETAM SCH MG: 100 SOLUTION ORAL at 17:02

## 2018-09-20 RX ADMIN — INSULIN HUMAN PRN UNIT: 100 INJECTION, SOLUTION PARENTERAL at 17:52

## 2018-09-20 RX ADMIN — HYDROCORTISONE SODIUM SUCCINATE SCH MG: 100 INJECTION, POWDER, FOR SOLUTION INTRAMUSCULAR; INTRAVASCULAR at 08:32

## 2018-09-20 RX ADMIN — LACTOBACILLUS TAB SCH EACH: TAB at 08:30

## 2018-09-20 RX ADMIN — INSULIN HUMAN PRN UNIT: 100 INJECTION, SOLUTION PARENTERAL at 06:06

## 2018-09-20 RX ADMIN — LACTOBACILLUS TAB SCH EACH: TAB at 12:03

## 2018-09-20 RX ADMIN — SODIUM CHLORIDE SCH MG: 9 INJECTION, SOLUTION INTRAVENOUS at 08:32

## 2018-09-20 RX ADMIN — SUCRALFATE SCH G: 1 SUSPENSION ORAL at 12:03

## 2018-09-20 RX ADMIN — Medication SCH MG: at 01:27

## 2018-09-20 RX ADMIN — OXYCODONE HYDROCHLORIDE AND ACETAMINOPHEN SCH MG: 500 TABLET ORAL at 08:32

## 2018-09-20 RX ADMIN — Medication SCH MG: at 13:20

## 2018-09-20 RX ADMIN — ALBUTEROL SULFATE SCH MG: 2.5 SOLUTION RESPIRATORY (INHALATION) at 07:32

## 2018-09-20 RX ADMIN — DEXTROSE MONOHYDRATE SCH MLS/HR: 50 INJECTION, SOLUTION INTRAVENOUS at 21:15

## 2018-09-20 RX ADMIN — Medication SCH EACH: at 06:04

## 2018-09-20 RX ADMIN — ALBUTEROL SULFATE SCH MG: 2.5 SOLUTION RESPIRATORY (INHALATION) at 13:20

## 2018-09-20 RX ADMIN — ALBUTEROL SULFATE SCH MG: 2.5 SOLUTION RESPIRATORY (INHALATION) at 01:27

## 2018-09-20 RX ADMIN — LACTOBACILLUS TAB SCH EACH: TAB at 17:02

## 2018-09-20 RX ADMIN — SODIUM CHLORIDE SCH MG: 9 INJECTION, SOLUTION INTRAVENOUS at 17:02

## 2018-09-20 RX ADMIN — Medication SCH MG: at 19:51

## 2018-09-20 RX ADMIN — Medication SCH EACH: at 17:50

## 2018-09-20 RX ADMIN — HYDROCORTISONE SODIUM SUCCINATE SCH MG: 100 INJECTION, POWDER, FOR SOLUTION INTRAMUSCULAR; INTRAVASCULAR at 12:04

## 2018-09-20 RX ADMIN — LEVETIRACETAM SCH MG: 100 SOLUTION ORAL at 08:30

## 2018-09-20 RX ADMIN — HYDROCORTISONE SODIUM SUCCINATE SCH MG: 100 INJECTION, POWDER, FOR SOLUTION INTRAMUSCULAR; INTRAVASCULAR at 17:02

## 2018-09-20 RX ADMIN — Medication PRN ML: at 03:36

## 2018-09-20 RX ADMIN — INSULIN HUMAN PRN UNIT: 100 INJECTION, SOLUTION PARENTERAL at 12:19

## 2018-09-20 RX ADMIN — TOPIRAMATE SCH MG: 100 TABLET, COATED ORAL at 08:32

## 2018-09-20 RX ADMIN — TOPIRAMATE SCH MG: 100 TABLET, COATED ORAL at 17:02

## 2018-09-20 NOTE — NUR
TELE RN NOTES



NEW ORDERS FROM DR SHARATH FIELD IVP 40MG NOW, REPEAT CXR AND BNP IN THE AM WILL INDORSE TO 
[facility]ONEY.

## 2018-09-20 NOTE — NUR
TELE RN NOTES



PATIENT CONTINUES TO RETAIN FLUID DOCTOR AWARE AND NEW ORDERS TO HOLD FLUIDS ,FEEDING HELD 
,IV FLUIDS HELD . OUTPUT 850 ML PELAYO CATH. WILL CONTINUE TO MONITOR

## 2018-09-20 NOTE — NUR
PT RECEIVED TRACHED ON THE VENT WITH NOTED SETINGS. VENT ALARMS ARE SET AND AUDIBLE WITH BVM 
BY BEDSIDE. MLT CUFF PRESSURE NOTED. HHN TX GIVEN IN LINE WITH NO ADVERSE REACTIONS. VENT IS 
PLUGGED INTO RED OUTLET. PT SX'D MODERATE THICK PALE YELLOW SECRETIONS. NO RESPIRATORY 
DISTRESS NOTED AT THIS TIME, WILL CONTINUE TO MONITOR.

-------------------------------------------------------------------------------

Addendum: 09/20/18 at 1952 by RAYNA GONSALVES RT

-------------------------------------------------------------------------------

Amended: Links added.

## 2018-09-20 NOTE — NUR
RN NOTES



RECEIVED PATIENT WITH GTF AT 35cc/hr, PER REPORT, PATIENT HAS BEEN HAVING HIGH GASTRIC 
RESIDUALS. CHECKED PATIENT'S RESIDUAL AND NOTED TO BE 30cc. WILL ADVANCE GTF RATE BY 10cc/hr 
EVERY 8HOURS TO GOAL OF 70cc/hr, AS NOTED ON RD RECOMMENDATIONS. 

PATIENT CURRENTLY ON 45cc/hr, MONITORED GASTRIC RESIDUAL CLOSELY AND NOTED TO BE LESS THAN 
30 OVERNIGHT. WILL ENDORSE ACCORDINGLY FOR MONITORING AND TO ADVANCE PER PATIENT'S 
TOLERANCE. CONT ON CARAFATE AS ORDERED.

VENT SETTINGS TOLERATED WELL, AIRWAY KEPT CLEAR, SUCTIONED AIRWAY AS NEEDED. 

F/C INTACT AND DRAINING WELL.

IVF AS ORDERED ON LISA PICC LINE, INTACT AND PATENT.

NSR WITH HR IN THE 80-90s.

## 2018-09-20 NOTE — NUR
RN TELE NURSE



PATIENT HAS NO RESIDUAL FEEDING INCREASED TO 65ML/HR AS ORDERED 200 H2O GIVEN WITH MEDS AND 
TOLERATED. HOB 30 DEGREES FOR ASPIRATION PRECAUTION.

## 2018-09-20 NOTE — NUR
RT



PT RECEIVED WITH A PORTEX 9 TRACH ON THE VENT WITH NOTED SETTINGS. PT IS AWAKE BUT DOES NOT 
FOLLOW COMMANDS. VENT ALARMS ARE SET AND AUDIBLE WITH BVM BY BEDSIDE. MLT CUFF PRESSURE 
NOTED. VENT IS PLUGGED INTO RED OUTLET. HHN TX GIVEN INLINE WITH NO ADVERSE REACTIONS. SX'D 
MODERATE THIN BLOOD TINGED SECRETIONS. NO RESPIRATORY DISTRESS NOTED AT THIS TIME, WILL 
CONTINUE TO MONITOR.

-------------------------------------------------------------------------------

Addendum: 09/20/18 at 0846 by ELEANOR MALDONADO RT

-------------------------------------------------------------------------------

Amended: Links added.

## 2018-09-20 NOTE — NUR
TELE/RN NOTES:



RECEIVED PT. VIA BED W/ ACLS PROTOCOL  HOB ELEVATED. OBTUNDED. MECH. VENT. TOLERATING VENT 
SETTINGS WELL. ON TELE MONITOR SR. W/ GTF TOLERATING WELL. W/ IVF TOLERATING WELL. W/ 
GENERALIZED EDEMA PITTING + 2 ALL OVER. W/ F/C DRAINING VIA GRAVITY. NO FACIAL GRIMACES OR 
MOANING NOTED. NO S/S OF RESP DISTRESS OR ACUTE PAIN

## 2018-09-21 VITALS — SYSTOLIC BLOOD PRESSURE: 109 MMHG | DIASTOLIC BLOOD PRESSURE: 68 MMHG

## 2018-09-21 VITALS — DIASTOLIC BLOOD PRESSURE: 61 MMHG | SYSTOLIC BLOOD PRESSURE: 121 MMHG

## 2018-09-21 VITALS — DIASTOLIC BLOOD PRESSURE: 69 MMHG | SYSTOLIC BLOOD PRESSURE: 98 MMHG

## 2018-09-21 VITALS — SYSTOLIC BLOOD PRESSURE: 95 MMHG | DIASTOLIC BLOOD PRESSURE: 60 MMHG

## 2018-09-21 VITALS — DIASTOLIC BLOOD PRESSURE: 66 MMHG | SYSTOLIC BLOOD PRESSURE: 112 MMHG

## 2018-09-21 VITALS — SYSTOLIC BLOOD PRESSURE: 116 MMHG | DIASTOLIC BLOOD PRESSURE: 77 MMHG

## 2018-09-21 LAB
ALBUMIN SERPL ELPH-MCNC: 2.5 G/DL (ref 2.9–4.4)
ALBUMIN/GLOB SERPL: 0.7 {RATIO} (ref 0.7–1.7)
ALPHA1 GLOB SERPL ELPH-MCNC: 0.3 G/DL (ref 0–0.4)
ALPHA2 GLOB SERPL ELPH-MCNC: 0.9 G/DL (ref 0.4–1)
B-GLOBULIN SERPL ELPH-MCNC: 0.8 G/DL (ref 0.7–1.3)
BASOPHILS # BLD AUTO: 0 /CMM (ref 0–0.2)
BASOPHILS NFR BLD AUTO: 0.3 % (ref 0–2)
BUN SERPL-MCNC: 38 MG/DL (ref 7–18)
CALCIUM SERPL-MCNC: 8.1 MG/DL (ref 8.5–10.1)
CHLORIDE SERPL-SCNC: 120 MMOL/L (ref 98–107)
CO2 SERPL-SCNC: 30 MMOL/L (ref 21–32)
CREAT SERPL-MCNC: 1.4 MG/DL (ref 0.6–1.3)
EOSINOPHIL NFR BLD AUTO: 0.3 % (ref 0–6)
GAMMA GLOB SERPL ELPH-MCNC: 1.4 G/DL (ref 0.4–1.8)
GLOBULIN SER CALC-MCNC: 3.4 G/DL (ref 2.2–3.9)
GLUCOSE SERPL-MCNC: 127 MG/DL (ref 74–106)
HCT VFR BLD AUTO: 25 % (ref 39–51)
HGB BLD-MCNC: 7.8 G/DL (ref 13.5–17.5)
IGA SERPL-MCNC: 187 MG/DL (ref 90–386)
IGM SERPL-MCNC: 57 MG/DL (ref 20–172)
LYMPHOCYTES NFR BLD AUTO: 19.2 % (ref 20–44)
LYMPHOCYTES NFR BLD AUTO: 2.6 /CMM (ref 0.8–4.8)
MAGNESIUM SERPL-MCNC: 2.5 MG/DL (ref 1.8–2.4)
MCHC RBC AUTO-ENTMCNC: 31 G/DL (ref 31–36)
MCV RBC AUTO: 95 FL (ref 80–96)
MONOCYTES NFR BLD AUTO: 1 /CMM (ref 0.1–1.3)
MONOCYTES NFR BLD AUTO: 7.6 % (ref 2–12)
NEUTROPHILS # BLD AUTO: 9.9 /CMM (ref 1.8–8.9)
NEUTROPHILS NFR BLD AUTO: 72.6 % (ref 43–81)
PHOSPHATE SERPL-MCNC: 2.9 MG/DL (ref 2.5–4.9)
PLATELET # BLD AUTO: 188 /CMM (ref 150–450)
POTASSIUM SERPL-SCNC: 3.2 MMOL/L (ref 3.5–5.1)
RBC # BLD AUTO: 2.68 MIL/UL (ref 4.5–6)
RDW COEFFICIENT OF VARIATION: 19.6 (ref 11.5–15)
SODIUM SERPL-SCNC: 157 MMOL/L (ref 136–145)
WBC NRBC COR # BLD AUTO: 13.7 K/UL (ref 4.3–11)

## 2018-09-21 RX ADMIN — TOPIRAMATE SCH MG: 100 TABLET, COATED ORAL at 08:25

## 2018-09-21 RX ADMIN — OXYCODONE HYDROCHLORIDE AND ACETAMINOPHEN SCH MG: 500 TABLET ORAL at 08:25

## 2018-09-21 RX ADMIN — INSULIN HUMAN PRN UNIT: 100 INJECTION, SOLUTION PARENTERAL at 23:13

## 2018-09-21 RX ADMIN — ALBUTEROL SULFATE SCH MG: 2.5 SOLUTION RESPIRATORY (INHALATION) at 13:39

## 2018-09-21 RX ADMIN — Medication SCH EACH: at 23:12

## 2018-09-21 RX ADMIN — Medication SCH MG: at 07:31

## 2018-09-21 RX ADMIN — INSULIN HUMAN PRN UNIT: 100 INJECTION, SOLUTION PARENTERAL at 17:09

## 2018-09-21 RX ADMIN — INSULIN HUMAN PRN UNIT: 100 INJECTION, SOLUTION PARENTERAL at 00:15

## 2018-09-21 RX ADMIN — ALBUTEROL SULFATE SCH MG: 2.5 SOLUTION RESPIRATORY (INHALATION) at 19:48

## 2018-09-21 RX ADMIN — INSULIN HUMAN PRN UNIT: 100 INJECTION, SOLUTION PARENTERAL at 12:17

## 2018-09-21 RX ADMIN — Medication SCH EACH: at 12:13

## 2018-09-21 RX ADMIN — SUCRALFATE SCH G: 1 SUSPENSION ORAL at 17:05

## 2018-09-21 RX ADMIN — ALBUTEROL SULFATE SCH MG: 2.5 SOLUTION RESPIRATORY (INHALATION) at 01:49

## 2018-09-21 RX ADMIN — SODIUM CHLORIDE SCH MG: 9 INJECTION, SOLUTION INTRAVENOUS at 17:05

## 2018-09-21 RX ADMIN — DOCUSATE SODIUM SCH MG: 50 LIQUID ORAL at 08:25

## 2018-09-21 RX ADMIN — HYDROCORTISONE SODIUM SUCCINATE SCH MG: 100 INJECTION, POWDER, FOR SOLUTION INTRAMUSCULAR; INTRAVASCULAR at 08:25

## 2018-09-21 RX ADMIN — LACTOBACILLUS TAB SCH EACH: TAB at 08:25

## 2018-09-21 RX ADMIN — Medication SCH MG: at 19:49

## 2018-09-21 RX ADMIN — Medication SCH MG: at 13:39

## 2018-09-21 RX ADMIN — Medication SCH EACH: at 00:13

## 2018-09-21 RX ADMIN — HYDROCORTISONE SODIUM SUCCINATE SCH MG: 100 INJECTION, POWDER, FOR SOLUTION INTRAMUSCULAR; INTRAVASCULAR at 17:05

## 2018-09-21 RX ADMIN — ALBUTEROL SULFATE SCH MG: 2.5 SOLUTION RESPIRATORY (INHALATION) at 07:31

## 2018-09-21 RX ADMIN — LACTOBACILLUS TAB SCH EACH: TAB at 17:05

## 2018-09-21 RX ADMIN — INSULIN GLARGINE SCH UNIT: 100 INJECTION, SOLUTION SUBCUTANEOUS at 21:28

## 2018-09-21 RX ADMIN — TOPIRAMATE SCH MG: 100 TABLET, COATED ORAL at 17:05

## 2018-09-21 RX ADMIN — SUCRALFATE SCH G: 1 SUSPENSION ORAL at 21:21

## 2018-09-21 RX ADMIN — LEVETIRACETAM SCH MG: 100 SOLUTION ORAL at 08:25

## 2018-09-21 RX ADMIN — Medication SCH EACH: at 05:08

## 2018-09-21 RX ADMIN — LEVETIRACETAM SCH MG: 100 SOLUTION ORAL at 17:05

## 2018-09-21 RX ADMIN — SUCRALFATE SCH G: 1 SUSPENSION ORAL at 08:25

## 2018-09-21 RX ADMIN — SODIUM CHLORIDE SCH MG: 9 INJECTION, SOLUTION INTRAVENOUS at 08:25

## 2018-09-21 RX ADMIN — Medication SCH OZ: at 08:31

## 2018-09-21 RX ADMIN — Medication PRN ML: at 05:09

## 2018-09-21 RX ADMIN — Medication SCH EACH: at 17:05

## 2018-09-21 RX ADMIN — HYDROCORTISONE SODIUM SUCCINATE SCH MG: 100 INJECTION, POWDER, FOR SOLUTION INTRAMUSCULAR; INTRAVASCULAR at 12:12

## 2018-09-21 RX ADMIN — LACTOBACILLUS TAB SCH EACH: TAB at 12:12

## 2018-09-21 RX ADMIN — Medication SCH MG: at 01:49

## 2018-09-21 RX ADMIN — DEXTROSE MONOHYDRATE SCH MLS/HR: 50 INJECTION, SOLUTION INTRAVENOUS at 20:26

## 2018-09-21 RX ADMIN — LORAZEPAM PRN MG: 2 INJECTION INTRAMUSCULAR; INTRAVENOUS at 18:36

## 2018-09-21 RX ADMIN — INSULIN HUMAN PRN UNIT: 100 INJECTION, SOLUTION PARENTERAL at 21:29

## 2018-09-21 RX ADMIN — SUCRALFATE SCH G: 1 SUSPENSION ORAL at 12:12

## 2018-09-21 RX ADMIN — ATORVASTATIN CALCIUM SCH MG: 10 TABLET, FILM COATED ORAL at 08:25

## 2018-09-21 NOTE — NUR
Male arabella pt received on mechanical vent.  Pt arabella is secure.  Vent is plugged into a red 
outlet, alarms are set and audible, and BMV is at bedside.

-------------------------------------------------------------------------------

Addendum: 09/21/18 at 0736 by YVONNE RODRIGUEZ RT

-------------------------------------------------------------------------------

Amended: Links added.

## 2018-09-21 NOTE — NUR
TELE RN OPENING NOTE



RECEIVED PT  HOB ELEVATED, OBTUNDED. PT CURRENTLY ON VENT, TOLERATING WELL W/ FREQUENT 
SUCTIONING. NO FACIAL GRIMACING OR MOANING NOTED, GTF GLUCERNA 1.2@50ML, WELL TRISTAN NO 
RESIDUAL. PICC LINE INTACT, DRESSING KEPT CLEAN AND DRY. GENERALIZED EDEMA TO BUE/BLE, 
EXTREMITIES KEPT ELEVATED. WILL REPOSITION PER PROTOCOL TO PREVENT FURTHER SKIN ISSUES. 
SAFETY MEASURES ARE IN PLACE. WILL CONTINUE TO MONITOR THROUGHOUT SHIFT FOR CONTINUITY OF 
CARE.

## 2018-09-21 NOTE — NUR
RN CLOSING NOTE



WATER FLUSHING AS ORDERED. GTF RESTARTED AS ORDERED, GASTRIC RESIDUAL MONITORED, HOB 
ELEVATED. ASPIRATION PRECAUTION OBSERVED. VENT SETTINGS TOLERATED WELL. AIRWAY SUCTIONED AS 
NEEDED, KEPT CLEAR. F/C INTACT AND DRAINING WELL, DIURESING WELL. REMAINS NSR. TURNED AND 
REPOSITIONED. TRACH CARE AND ORAL CARE DONE. SAFETY AND COMFORT ENSURED AT ALL TIMES. NEEDS 
ANTICIPATED AND MET. WILL ENDORSE ACCORDINGLY FOR CONTINUITY OF CARE.

## 2018-09-21 NOTE — NUR
TELE RN OPENING NOTE



RECEIVED PT SITTING UPRIGHT IN BED, OBTUNDED. PT CURRENTLY ON VENT, TOLERATING WELL W/ 
FREQUENT SUCTIONING. NO FACIAL GRIMACING OR MOANING NOTED. PICC LINE INTACT, DRESSING KEPT 
CLEAN AND DRY. GENERALIZED EDEMA TO BUE/BLE, EXTREMITIES KEPT ELEVATED. WILL REPOSITION PER 
PROTOCOL TO PREVENT FURTHER SKIN ISSUES. SAFETY MEASURES ARE IN PLACE. WILL CONTINUE TO 
MONITOR THROUGHOUT SHIFT FOR CONTINUITY OF CARE.

## 2018-09-21 NOTE — NUR
TELE RN CLOSING NOTES



ALL DUE MEDS GIVEN, NEEDS ANTICIPATED. PT REMAINS OBTUNDED. ON CONTINUOUS VENT AND 
TOLERATING WELL, NOT IN ANY APPARENT DISTRESS NOTED. FREQUENT SUCTIONING. LISA PICC LINE 
INTACT, DRESSING KEPT CLEAN AND DRY. GT IS IN CORRECT PLACEMENT UPON AUSCULTATION. FLUSHED 
200ML Q4H WITH GTF AT 50CC/HR. RESIDUAL AT THIS TIME IS 40ML. PER DR. EARLY, "IT'S NOT 
THAT BAD. CONTINUE FEEDING." SAFETY MEASURES ARE IN PLACE. REPOSITION PER PROTOCOL TO 
PREVENT FURTHER SKIN INJURIES. WILL ENDORSE TO NEXT SHIFT FOR CONTINUITY OF CARE.

## 2018-09-22 VITALS — DIASTOLIC BLOOD PRESSURE: 76 MMHG | SYSTOLIC BLOOD PRESSURE: 114 MMHG

## 2018-09-22 VITALS — DIASTOLIC BLOOD PRESSURE: 62 MMHG | SYSTOLIC BLOOD PRESSURE: 100 MMHG

## 2018-09-22 VITALS — SYSTOLIC BLOOD PRESSURE: 111 MMHG | DIASTOLIC BLOOD PRESSURE: 72 MMHG

## 2018-09-22 VITALS — SYSTOLIC BLOOD PRESSURE: 115 MMHG | DIASTOLIC BLOOD PRESSURE: 70 MMHG

## 2018-09-22 VITALS — DIASTOLIC BLOOD PRESSURE: 66 MMHG | SYSTOLIC BLOOD PRESSURE: 112 MMHG

## 2018-09-22 LAB
BASOPHILS # BLD AUTO: 0 /CMM (ref 0–0.2)
BASOPHILS NFR BLD AUTO: 0.2 % (ref 0–2)
BUN SERPL-MCNC: 35 MG/DL (ref 7–18)
CALCIUM SERPL-MCNC: 8.1 MG/DL (ref 8.5–10.1)
CHLORIDE SERPL-SCNC: 122 MMOL/L (ref 98–107)
CO2 SERPL-SCNC: 31 MMOL/L (ref 21–32)
CREAT SERPL-MCNC: 1.2 MG/DL (ref 0.6–1.3)
EOSINOPHIL NFR BLD AUTO: 0.4 % (ref 0–6)
GLUCOSE SERPL-MCNC: 84 MG/DL (ref 74–106)
HCT VFR BLD AUTO: 26 % (ref 39–51)
HGB BLD-MCNC: 8.1 G/DL (ref 13.5–17.5)
LYMPHOCYTES NFR BLD AUTO: 2.6 /CMM (ref 0.8–4.8)
LYMPHOCYTES NFR BLD AUTO: 22.8 % (ref 20–44)
MAGNESIUM SERPL-MCNC: 2.4 MG/DL (ref 1.8–2.4)
MCHC RBC AUTO-ENTMCNC: 31 G/DL (ref 31–36)
MCV RBC AUTO: 96 FL (ref 80–96)
MONOCYTES NFR BLD AUTO: 1.1 /CMM (ref 0.1–1.3)
MONOCYTES NFR BLD AUTO: 10 % (ref 2–12)
NEUTROPHILS # BLD AUTO: 7.6 /CMM (ref 1.8–8.9)
NEUTROPHILS NFR BLD AUTO: 66.6 % (ref 43–81)
PHOSPHATE SERPL-MCNC: 2.3 MG/DL (ref 2.5–4.9)
PLATELET # BLD AUTO: 239 /CMM (ref 150–450)
POTASSIUM SERPL-SCNC: 3.3 MMOL/L (ref 3.5–5.1)
RBC # BLD AUTO: 2.71 MIL/UL (ref 4.5–6)
RDW COEFFICIENT OF VARIATION: 20.1 (ref 11.5–15)
SODIUM SERPL-SCNC: 159 MMOL/L (ref 136–145)
WBC NRBC COR # BLD AUTO: 11.5 K/UL (ref 4.3–11)

## 2018-09-22 RX ADMIN — Medication SCH OZ: at 08:37

## 2018-09-22 RX ADMIN — Medication SCH MG: at 01:40

## 2018-09-22 RX ADMIN — Medication PRN ML: at 05:24

## 2018-09-22 RX ADMIN — OXYCODONE HYDROCHLORIDE AND ACETAMINOPHEN SCH MG: 500 TABLET ORAL at 08:24

## 2018-09-22 RX ADMIN — ALBUTEROL SULFATE SCH MG: 2.5 SOLUTION RESPIRATORY (INHALATION) at 08:08

## 2018-09-22 RX ADMIN — LEVETIRACETAM SCH MG: 100 SOLUTION ORAL at 16:31

## 2018-09-22 RX ADMIN — ALBUTEROL SULFATE SCH MG: 2.5 SOLUTION RESPIRATORY (INHALATION) at 13:18

## 2018-09-22 RX ADMIN — DOCUSATE SODIUM SCH MG: 50 LIQUID ORAL at 08:24

## 2018-09-22 RX ADMIN — HYDROCORTISONE SODIUM SUCCINATE SCH MG: 100 INJECTION, POWDER, FOR SOLUTION INTRAMUSCULAR; INTRAVASCULAR at 16:31

## 2018-09-22 RX ADMIN — SUCRALFATE SCH G: 1 SUSPENSION ORAL at 11:32

## 2018-09-22 RX ADMIN — LACTOBACILLUS TAB SCH EACH: TAB at 08:24

## 2018-09-22 RX ADMIN — HYDROCORTISONE SODIUM SUCCINATE SCH MG: 100 INJECTION, POWDER, FOR SOLUTION INTRAMUSCULAR; INTRAVASCULAR at 13:16

## 2018-09-22 RX ADMIN — Medication SCH EACH: at 05:25

## 2018-09-22 RX ADMIN — Medication SCH MG: at 13:18

## 2018-09-22 RX ADMIN — LACTOBACILLUS TAB SCH EACH: TAB at 13:17

## 2018-09-22 RX ADMIN — INSULIN GLARGINE SCH UNIT: 100 INJECTION, SOLUTION SUBCUTANEOUS at 21:28

## 2018-09-22 RX ADMIN — ALBUTEROL SULFATE SCH MG: 2.5 SOLUTION RESPIRATORY (INHALATION) at 01:40

## 2018-09-22 RX ADMIN — TOPIRAMATE SCH MG: 100 TABLET, COATED ORAL at 16:30

## 2018-09-22 RX ADMIN — INSULIN HUMAN PRN UNIT: 100 INJECTION, SOLUTION PARENTERAL at 17:39

## 2018-09-22 RX ADMIN — SUCRALFATE SCH G: 1 SUSPENSION ORAL at 21:16

## 2018-09-22 RX ADMIN — SODIUM CHLORIDE SCH MG: 9 INJECTION, SOLUTION INTRAVENOUS at 08:24

## 2018-09-22 RX ADMIN — Medication SCH MG: at 08:08

## 2018-09-22 RX ADMIN — SUCRALFATE SCH G: 1 SUSPENSION ORAL at 07:50

## 2018-09-22 RX ADMIN — LEVETIRACETAM SCH MG: 100 SOLUTION ORAL at 08:24

## 2018-09-22 RX ADMIN — Medication SCH EACH: at 17:37

## 2018-09-22 RX ADMIN — INSULIN HUMAN PRN UNIT: 100 INJECTION, SOLUTION PARENTERAL at 11:33

## 2018-09-22 RX ADMIN — SODIUM CHLORIDE SCH MG: 9 INJECTION, SOLUTION INTRAVENOUS at 16:31

## 2018-09-22 RX ADMIN — HYDROCORTISONE SODIUM SUCCINATE SCH MG: 100 INJECTION, POWDER, FOR SOLUTION INTRAMUSCULAR; INTRAVASCULAR at 08:24

## 2018-09-22 RX ADMIN — ALBUTEROL SULFATE SCH MG: 2.5 SOLUTION RESPIRATORY (INHALATION) at 20:04

## 2018-09-22 RX ADMIN — Medication SCH EACH: at 11:32

## 2018-09-22 RX ADMIN — Medication SCH MG: at 20:04

## 2018-09-22 RX ADMIN — ATORVASTATIN CALCIUM SCH MG: 10 TABLET, FILM COATED ORAL at 08:24

## 2018-09-22 RX ADMIN — SUCRALFATE SCH G: 1 SUSPENSION ORAL at 16:31

## 2018-09-22 RX ADMIN — LACTOBACILLUS TAB SCH EACH: TAB at 16:31

## 2018-09-22 RX ADMIN — LORAZEPAM PRN MG: 2 INJECTION INTRAMUSCULAR; INTRAVENOUS at 00:12

## 2018-09-22 RX ADMIN — TOPIRAMATE SCH MG: 100 TABLET, COATED ORAL at 08:29

## 2018-09-22 RX ADMIN — DEXTROSE MONOHYDRATE SCH MLS/HR: 50 INJECTION, SOLUTION INTRAVENOUS at 21:16

## 2018-09-22 RX ADMIN — INSULIN HUMAN PRN UNIT: 100 INJECTION, SOLUTION PARENTERAL at 21:30

## 2018-09-22 NOTE — NUR
RN NOTE



RECEIVED PATIENT  HOB ELEVATED FOR COMFORT, OBTUNDED, BUT IS ABLE TO OPEN EYES WITH TACTILE 
STIMULI. VET/TRACH DEPENDENT WITH APPROPRIATE SETTINGS AND SATURATING WELL. ON CARDIAC 
MONITOR SINUS RHYTHM HR OF 67. PICC LINE INTACT AND PATENT. GENERALIZED EDEMA TO BUE/BLE, 
EXTREMITIES KEPT ELEVATED. ALL SAFETY MEASURES DONE. BED LOW AND LOCKED POSITION. WILL 
CONTINUE TO MONITOR.

## 2018-09-22 NOTE — NUR
pt received on vent via trach with charted settings. airway patent. trach secure via trach 
tie. pt awake. 

ambu bag at bedside. alarms set and audible, disconnect alarms checked plugged into red 
outlet

suctioned a moderate amount of thin red secretions. pt receiving breathing tx q6 at this 
time. pt hob at 30 degrees. suctioned oral secretion from pts mouth

-------------------------------------------------------------------------------

Addendum: 09/22/18 at 2011 by MATT BERG RT

-------------------------------------------------------------------------------

Amended: Links added.

## 2018-09-22 NOTE — NUR
TELE RN CLOSING  NOTE



ENDORSED PT  HOB ELEVATED, OBTUNDED. PT CURRENTLY ON VENT, TOLERATING WELL W/ FREQUENT 
SUCTIONING. NO FACIAL GRIMACING OR MOANING NOTED, GTF GLUCERNA 1.2@50ML, WELL TRISTAN NO 
RESIDUAL. PICC LINE INTACT, DRESSING KEPT CLEAN AND DRY. GENERALIZED EDEMA TO BUE/BLE, 
EXTREMITIES KEPT ELEVATED. WILL REPOSITION PER PROTOCOL TO PREVENT FURTHER SKIN ISSUES. 
SAFETY MEASURES ARE IN PLACE. WILL CONTINUE TO MONITOR THROUGHOUT SHIFT FOR CONTINUITY OF 
CARE.

## 2018-09-22 NOTE — NUR
RN NOTE



PATIENT REMAINED STABLE THROUGHOUT SHIFT. NO ACUTE CHANGES OR DISTRESS NOTED. WILL ENDORSE 
TO NEXT SHIFT TO CONTINUE TO MONITOR CONTINUITY OF CARE.

## 2018-09-22 NOTE — NUR
TELE RN OPENING NOTE



RECEIVED PT  HOB ELEVATED, OBTUNDED. PT CURRENTLY ON VENT, TOLERATING WELL W/ FREQUENT 
SUCTIONING. NO FACIAL GRIMACING OR MOANING NOTED, GTF GLUCERNA 1.2@50ML, WELL TRISTAN NO 
RESIDUAL. PICC LINE INTACT, D5W@50ML/HR, DRESSING KEPT CLEAN AND DRY. GENERALIZED EDEMA TO 
BUE/BLE, EXTREMITIES KEPT ELEVATED. WILL REPOSITION PER PROTOCOL TO PREVENT FURTHER SKIN 
ISSUES. SAFETY MEASURES ARE IN PLACE. WILL CONTINUE TO MONITOR THROUGHOUT SHIFT FOR 
CONTINUITY OF CARE.

## 2018-09-23 VITALS — SYSTOLIC BLOOD PRESSURE: 111 MMHG | DIASTOLIC BLOOD PRESSURE: 74 MMHG

## 2018-09-23 VITALS — SYSTOLIC BLOOD PRESSURE: 97 MMHG | DIASTOLIC BLOOD PRESSURE: 65 MMHG

## 2018-09-23 VITALS — DIASTOLIC BLOOD PRESSURE: 74 MMHG | SYSTOLIC BLOOD PRESSURE: 114 MMHG

## 2018-09-23 VITALS — SYSTOLIC BLOOD PRESSURE: 93 MMHG | DIASTOLIC BLOOD PRESSURE: 62 MMHG

## 2018-09-23 VITALS — DIASTOLIC BLOOD PRESSURE: 56 MMHG | SYSTOLIC BLOOD PRESSURE: 89 MMHG

## 2018-09-23 VITALS — DIASTOLIC BLOOD PRESSURE: 75 MMHG | SYSTOLIC BLOOD PRESSURE: 116 MMHG

## 2018-09-23 LAB
BASOPHILS # BLD AUTO: 0 /CMM (ref 0–0.2)
BASOPHILS NFR BLD AUTO: 0.2 % (ref 0–2)
BUN SERPL-MCNC: 34 MG/DL (ref 7–18)
CALCIUM SERPL-MCNC: 8.2 MG/DL (ref 8.5–10.1)
CHLORIDE SERPL-SCNC: 122 MMOL/L (ref 98–107)
CO2 SERPL-SCNC: 30 MMOL/L (ref 21–32)
CREAT SERPL-MCNC: 1.2 MG/DL (ref 0.6–1.3)
EOSINOPHIL NFR BLD AUTO: 0.6 % (ref 0–6)
GLUCOSE SERPL-MCNC: 128 MG/DL (ref 74–106)
HCT VFR BLD AUTO: 26 % (ref 39–51)
HGB BLD-MCNC: 8.1 G/DL (ref 13.5–17.5)
LYMPHOCYTES NFR BLD AUTO: 2.3 /CMM (ref 0.8–4.8)
LYMPHOCYTES NFR BLD AUTO: 21.7 % (ref 20–44)
MAGNESIUM SERPL-MCNC: 2.3 MG/DL (ref 1.8–2.4)
MCHC RBC AUTO-ENTMCNC: 31 G/DL (ref 31–36)
MCV RBC AUTO: 96 FL (ref 80–96)
MONOCYTES NFR BLD AUTO: 1 /CMM (ref 0.1–1.3)
MONOCYTES NFR BLD AUTO: 9 % (ref 2–12)
NEUTROPHILS # BLD AUTO: 7.3 /CMM (ref 1.8–8.9)
NEUTROPHILS NFR BLD AUTO: 68.5 % (ref 43–81)
PHOSPHATE SERPL-MCNC: 2.8 MG/DL (ref 2.5–4.9)
PLATELET # BLD AUTO: 314 /CMM (ref 150–450)
POTASSIUM SERPL-SCNC: 3.3 MMOL/L (ref 3.5–5.1)
RBC # BLD AUTO: 2.74 MIL/UL (ref 4.5–6)
RDW COEFFICIENT OF VARIATION: 20 (ref 11.5–15)
SODIUM SERPL-SCNC: 160 MMOL/L (ref 136–145)
WBC NRBC COR # BLD AUTO: 10.7 K/UL (ref 4.3–11)

## 2018-09-23 RX ADMIN — TOPIRAMATE SCH MG: 100 TABLET, COATED ORAL at 18:00

## 2018-09-23 RX ADMIN — Medication SCH MG: at 07:29

## 2018-09-23 RX ADMIN — HYDROCORTISONE SODIUM SUCCINATE SCH MG: 100 INJECTION, POWDER, FOR SOLUTION INTRAMUSCULAR; INTRAVASCULAR at 18:00

## 2018-09-23 RX ADMIN — Medication SCH EACH: at 23:06

## 2018-09-23 RX ADMIN — Medication SCH OZ: at 09:23

## 2018-09-23 RX ADMIN — Medication SCH MG: at 19:44

## 2018-09-23 RX ADMIN — ALBUTEROL SULFATE SCH MG: 2.5 SOLUTION RESPIRATORY (INHALATION) at 12:46

## 2018-09-23 RX ADMIN — DEXTROSE MONOHYDRATE PRN MLS/HR: 50 INJECTION, SOLUTION INTRAVENOUS at 09:22

## 2018-09-23 RX ADMIN — INSULIN HUMAN PRN UNIT: 100 INJECTION, SOLUTION PARENTERAL at 18:17

## 2018-09-23 RX ADMIN — TOPIRAMATE SCH MG: 100 TABLET, COATED ORAL at 09:17

## 2018-09-23 RX ADMIN — INSULIN HUMAN PRN UNIT: 100 INJECTION, SOLUTION PARENTERAL at 00:02

## 2018-09-23 RX ADMIN — ALBUTEROL SULFATE SCH MG: 2.5 SOLUTION RESPIRATORY (INHALATION) at 00:58

## 2018-09-23 RX ADMIN — Medication SCH MG: at 00:58

## 2018-09-23 RX ADMIN — HYDROCORTISONE SODIUM SUCCINATE SCH MG: 100 INJECTION, POWDER, FOR SOLUTION INTRAMUSCULAR; INTRAVASCULAR at 12:39

## 2018-09-23 RX ADMIN — LEVETIRACETAM SCH MG: 100 SOLUTION ORAL at 09:15

## 2018-09-23 RX ADMIN — Medication SCH MG: at 12:46

## 2018-09-23 RX ADMIN — ATORVASTATIN CALCIUM SCH MG: 10 TABLET, FILM COATED ORAL at 09:16

## 2018-09-23 RX ADMIN — LACTOBACILLUS TAB SCH EACH: TAB at 09:17

## 2018-09-23 RX ADMIN — SODIUM CHLORIDE SCH MG: 9 INJECTION, SOLUTION INTRAVENOUS at 18:00

## 2018-09-23 RX ADMIN — SODIUM CHLORIDE SCH MG: 9 INJECTION, SOLUTION INTRAVENOUS at 09:16

## 2018-09-23 RX ADMIN — LACTOBACILLUS TAB SCH EACH: TAB at 12:39

## 2018-09-23 RX ADMIN — SUCRALFATE SCH G: 1 SUSPENSION ORAL at 12:39

## 2018-09-23 RX ADMIN — ALBUTEROL SULFATE SCH MG: 2.5 SOLUTION RESPIRATORY (INHALATION) at 19:44

## 2018-09-23 RX ADMIN — ALBUTEROL SULFATE SCH MG: 2.5 SOLUTION RESPIRATORY (INHALATION) at 07:29

## 2018-09-23 RX ADMIN — LEVETIRACETAM SCH MG: 100 SOLUTION ORAL at 17:59

## 2018-09-23 RX ADMIN — Medication SCH EACH: at 18:16

## 2018-09-23 RX ADMIN — HYDROCORTISONE SODIUM SUCCINATE SCH MG: 100 INJECTION, POWDER, FOR SOLUTION INTRAMUSCULAR; INTRAVASCULAR at 09:16

## 2018-09-23 RX ADMIN — INSULIN HUMAN PRN UNIT: 100 INJECTION, SOLUTION PARENTERAL at 12:55

## 2018-09-23 RX ADMIN — DEXTROSE MONOHYDRATE SCH MLS/HR: 50 INJECTION, SOLUTION INTRAVENOUS at 20:33

## 2018-09-23 RX ADMIN — SUCRALFATE SCH G: 1 SUSPENSION ORAL at 22:48

## 2018-09-23 RX ADMIN — INSULIN HUMAN PRN UNIT: 100 INJECTION, SOLUTION PARENTERAL at 23:03

## 2018-09-23 RX ADMIN — OXYCODONE HYDROCHLORIDE AND ACETAMINOPHEN SCH MG: 500 TABLET ORAL at 09:17

## 2018-09-23 RX ADMIN — SUCRALFATE SCH G: 1 SUSPENSION ORAL at 17:59

## 2018-09-23 RX ADMIN — DEXTROSE MONOHYDRATE PRN MLS/HR: 50 INJECTION, SOLUTION INTRAVENOUS at 18:42

## 2018-09-23 RX ADMIN — DOCUSATE SODIUM SCH MG: 50 LIQUID ORAL at 09:16

## 2018-09-23 RX ADMIN — Medication SCH EACH: at 05:17

## 2018-09-23 RX ADMIN — Medication PRN ML: at 04:53

## 2018-09-23 RX ADMIN — INSULIN GLARGINE SCH UNIT: 100 INJECTION, SOLUTION SUBCUTANEOUS at 23:02

## 2018-09-23 RX ADMIN — LACTOBACILLUS TAB SCH EACH: TAB at 18:00

## 2018-09-23 RX ADMIN — SUCRALFATE SCH G: 1 SUSPENSION ORAL at 09:15

## 2018-09-23 RX ADMIN — Medication SCH EACH: at 00:01

## 2018-09-23 RX ADMIN — Medication SCH EACH: at 12:56

## 2018-09-23 NOTE — NUR
TELE 1 RN NOTE

PT IN BED OBTUNDED. ON VENT/TRACH TOLERATING THE SETTINGS WELL. NO DISTRESS OR DISCOMFORT 
NOTED. ON TELE MONITOR SR HR 74. F/C INTACT AND PATENT DRAINING YELLOWISH COLOR URINE. GTF 
GLUCERNA 1.2 TOLU INFUSING AT 50 ML/HR, 80 ML RESIDUAL NOTED. GTF FLUSHED AS ORDERED WITH 
WATER. KEPT HOB ELEVATED. LISA PICC LINE WITH D5W  ML/HR INFUSING WELL. NO S/S OF 
INFILTRATION NOTED. SIDE RAILS UP X 3 AND CALL LIGHT WITHIN REACH. CONTINUE TO MONITOR HIM.

## 2018-09-23 NOTE — NUR
INITIAL

PT  HOB ELEVATED, OBTUNDED. PT CURRENTLY ON VENT, TOLERATING WELL  NO FACIAL GRIMACING OR 
MOANING NOTED, GTF GLUCERNA 1.2@50ML, WELL TOLERATING NO RESIDUAL. PICC LINE INTACT, 
D5W@75ML/HR, DRESSING KEPT CLEAN AND DRY. GENERALIZED EDEMA TO BUE/BLE, EXTREMITIES KEPT 
ELEVATED. WILL REPOSITION PER PROTOCOL TO PREVENT FURTHER SKIN ISSUES. SAFETY MEASURES ARE 
IN PLACE. WILL CONTINUE TO MONITOR THROUGHOUT SHIFT FOR CONTINUITY OF CARE.

## 2018-09-23 NOTE — NUR
TELE RN CLOSING NOTE



ENDORSED PT  HOB ELEVATED, OBTUNDED. PT CURRENTLY ON VENT, TOLERATING WELL W/ FREQUENT 
SUCTIONING. NO FACIAL GRIMACING OR MOANING NOTED, GTF GLUCERNA 1.2@50ML, WELL TRISTAN NO 
RESIDUAL. PICC LINE INTACT, D5W@50ML/HR, DRESSING KEPT CLEAN AND DRY. GENERALIZED EDEMA TO 
BUE/BLE, EXTREMITIES KEPT ELEVATED. WILL REPOSITION PER PROTOCOL TO PREVENT FURTHER SKIN 
ISSUES. SAFETY MEASURES ARE IN PLACE. WILL CONTINUE TO MONITOR THROUGHOUT SHIFT FOR 
CONTINUITY OF CARE.

## 2018-09-24 VITALS — DIASTOLIC BLOOD PRESSURE: 62 MMHG | SYSTOLIC BLOOD PRESSURE: 113 MMHG

## 2018-09-24 VITALS — SYSTOLIC BLOOD PRESSURE: 119 MMHG | DIASTOLIC BLOOD PRESSURE: 75 MMHG

## 2018-09-24 VITALS — SYSTOLIC BLOOD PRESSURE: 111 MMHG | DIASTOLIC BLOOD PRESSURE: 69 MMHG

## 2018-09-24 VITALS — SYSTOLIC BLOOD PRESSURE: 125 MMHG | DIASTOLIC BLOOD PRESSURE: 76 MMHG

## 2018-09-24 VITALS — SYSTOLIC BLOOD PRESSURE: 111 MMHG | DIASTOLIC BLOOD PRESSURE: 66 MMHG

## 2018-09-24 VITALS — SYSTOLIC BLOOD PRESSURE: 120 MMHG | DIASTOLIC BLOOD PRESSURE: 74 MMHG

## 2018-09-24 LAB
BASOPHILS # BLD AUTO: 0 /CMM (ref 0–0.2)
BASOPHILS NFR BLD AUTO: 0.2 % (ref 0–2)
BUN SERPL-MCNC: 30 MG/DL (ref 7–18)
CALCIUM SERPL-MCNC: 8.1 MG/DL (ref 8.5–10.1)
CHLORIDE SERPL-SCNC: 119 MMOL/L (ref 98–107)
CO2 SERPL-SCNC: 30 MMOL/L (ref 21–32)
CREAT SERPL-MCNC: 1.2 MG/DL (ref 0.6–1.3)
EOSINOPHIL NFR BLD AUTO: 0.2 % (ref 0–6)
GLUCOSE SERPL-MCNC: 181 MG/DL (ref 74–106)
HCT VFR BLD AUTO: 27 % (ref 39–51)
HGB BLD-MCNC: 8.2 G/DL (ref 13.5–17.5)
LYMPHOCYTES NFR BLD AUTO: 16.2 % (ref 20–44)
LYMPHOCYTES NFR BLD AUTO: 2.1 /CMM (ref 0.8–4.8)
LYMPHOCYTES NFR BLD MANUAL: 8 % (ref 16–48)
MAGNESIUM SERPL-MCNC: 2.1 MG/DL (ref 1.8–2.4)
MCHC RBC AUTO-ENTMCNC: 30 G/DL (ref 31–36)
MCV RBC AUTO: 97 FL (ref 80–96)
MONOCYTES NFR BLD AUTO: 0.8 /CMM (ref 0.1–1.3)
MONOCYTES NFR BLD AUTO: 6.5 % (ref 2–12)
MONOCYTES NFR BLD MANUAL: 5 % (ref 0–11)
MYELOCYTES NFR BLD MANUAL: 1 % (ref 0–0)
NEUTROPHILS # BLD AUTO: 10 /CMM (ref 1.8–8.9)
NEUTROPHILS NFR BLD AUTO: 76.9 % (ref 43–81)
NEUTS BAND NFR BLD MANUAL: 5 % (ref 0–5)
NEUTS SEG NFR BLD MANUAL: 81 % (ref 42–76)
PHOSPHATE SERPL-MCNC: 2.9 MG/DL (ref 2.5–4.9)
PLATELET # BLD AUTO: 420 /CMM (ref 150–450)
POTASSIUM SERPL-SCNC: 3.2 MMOL/L (ref 3.5–5.1)
RBC # BLD AUTO: 2.79 MIL/UL (ref 4.5–6)
RDW COEFFICIENT OF VARIATION: 20.2 (ref 11.5–15)
SODIUM SERPL-SCNC: 157 MMOL/L (ref 136–145)
WBC NRBC COR # BLD AUTO: 12.9 K/UL (ref 4.3–11)

## 2018-09-24 RX ADMIN — ALBUTEROL SULFATE SCH MG: 2.5 SOLUTION RESPIRATORY (INHALATION) at 07:16

## 2018-09-24 RX ADMIN — LACTOBACILLUS TAB SCH EACH: TAB at 16:34

## 2018-09-24 RX ADMIN — SODIUM CHLORIDE, SODIUM LACTATE, POTASSIUM CHLORIDE, AND CALCIUM CHLORIDE PRN MLS/HR: .6; .31; .03; .02 INJECTION, SOLUTION INTRAVENOUS at 16:35

## 2018-09-24 RX ADMIN — DOCUSATE SODIUM SCH MG: 50 LIQUID ORAL at 08:05

## 2018-09-24 RX ADMIN — Medication SCH OZ: at 08:07

## 2018-09-24 RX ADMIN — TOPIRAMATE SCH MG: 100 TABLET, COATED ORAL at 08:05

## 2018-09-24 RX ADMIN — ACETAMINOPHEN PRN MG: 325 TABLET ORAL at 08:05

## 2018-09-24 RX ADMIN — Medication SCH MG: at 01:35

## 2018-09-24 RX ADMIN — POTASSIUM CHLORIDE SCH MEQ: 1.5 POWDER, FOR SOLUTION ORAL at 14:01

## 2018-09-24 RX ADMIN — Medication SCH EACH: at 05:56

## 2018-09-24 RX ADMIN — OXYCODONE HYDROCHLORIDE AND ACETAMINOPHEN SCH MG: 500 TABLET ORAL at 08:05

## 2018-09-24 RX ADMIN — LEVETIRACETAM SCH MG: 100 SOLUTION ORAL at 16:34

## 2018-09-24 RX ADMIN — INSULIN GLARGINE SCH UNIT: 100 INJECTION, SOLUTION SUBCUTANEOUS at 23:17

## 2018-09-24 RX ADMIN — Medication SCH MG: at 13:54

## 2018-09-24 RX ADMIN — SUCRALFATE SCH G: 1 SUSPENSION ORAL at 21:57

## 2018-09-24 RX ADMIN — POTASSIUM CHLORIDE SCH MEQ: 1.5 POWDER, FOR SOLUTION ORAL at 12:26

## 2018-09-24 RX ADMIN — HYDROCORTISONE SODIUM SUCCINATE SCH MG: 100 INJECTION, POWDER, FOR SOLUTION INTRAMUSCULAR; INTRAVASCULAR at 08:05

## 2018-09-24 RX ADMIN — SUCRALFATE SCH G: 1 SUSPENSION ORAL at 16:34

## 2018-09-24 RX ADMIN — Medication PRN ML: at 05:18

## 2018-09-24 RX ADMIN — LACTOBACILLUS TAB SCH EACH: TAB at 08:08

## 2018-09-24 RX ADMIN — HYDROCORTISONE SODIUM SUCCINATE SCH MG: 100 INJECTION, POWDER, FOR SOLUTION INTRAMUSCULAR; INTRAVASCULAR at 12:26

## 2018-09-24 RX ADMIN — DEXTROSE MONOHYDRATE SCH MLS/HR: 50 INJECTION, SOLUTION INTRAVENOUS at 20:05

## 2018-09-24 RX ADMIN — Medication SCH EACH: at 17:40

## 2018-09-24 RX ADMIN — INSULIN HUMAN PRN UNIT: 100 INJECTION, SOLUTION PARENTERAL at 12:01

## 2018-09-24 RX ADMIN — Medication SCH MG: at 07:16

## 2018-09-24 RX ADMIN — SODIUM CHLORIDE SCH MG: 9 INJECTION, SOLUTION INTRAVENOUS at 08:05

## 2018-09-24 RX ADMIN — ALBUTEROL SULFATE SCH MG: 2.5 SOLUTION RESPIRATORY (INHALATION) at 19:20

## 2018-09-24 RX ADMIN — Medication SCH MG: at 19:20

## 2018-09-24 RX ADMIN — SODIUM CHLORIDE SCH MG: 9 INJECTION, SOLUTION INTRAVENOUS at 16:34

## 2018-09-24 RX ADMIN — HYDROCORTISONE SODIUM SUCCINATE SCH MG: 100 INJECTION, POWDER, FOR SOLUTION INTRAMUSCULAR; INTRAVASCULAR at 16:34

## 2018-09-24 RX ADMIN — ALBUTEROL SULFATE SCH MG: 2.5 SOLUTION RESPIRATORY (INHALATION) at 13:54

## 2018-09-24 RX ADMIN — LACTOBACILLUS TAB SCH EACH: TAB at 12:25

## 2018-09-24 RX ADMIN — Medication SCH EACH: at 12:01

## 2018-09-24 RX ADMIN — DEXTROSE MONOHYDRATE PRN MLS/HR: 50 INJECTION, SOLUTION INTRAVENOUS at 05:18

## 2018-09-24 RX ADMIN — TOPIRAMATE SCH MG: 100 TABLET, COATED ORAL at 16:34

## 2018-09-24 RX ADMIN — SUCRALFATE SCH G: 1 SUSPENSION ORAL at 12:25

## 2018-09-24 RX ADMIN — ALBUTEROL SULFATE SCH MG: 2.5 SOLUTION RESPIRATORY (INHALATION) at 01:35

## 2018-09-24 RX ADMIN — SUCRALFATE SCH G: 1 SUSPENSION ORAL at 08:04

## 2018-09-24 RX ADMIN — Medication SCH EACH: at 23:17

## 2018-09-24 RX ADMIN — ATORVASTATIN CALCIUM SCH MG: 10 TABLET, FILM COATED ORAL at 08:05

## 2018-09-24 RX ADMIN — LEVETIRACETAM SCH MG: 100 SOLUTION ORAL at 08:05

## 2018-09-24 RX ADMIN — INSULIN HUMAN PRN UNIT: 100 INJECTION, SOLUTION PARENTERAL at 17:41

## 2018-09-24 RX ADMIN — INSULIN HUMAN PRN UNIT: 100 INJECTION, SOLUTION PARENTERAL at 23:18

## 2018-09-24 RX ADMIN — INSULIN HUMAN PRN UNIT: 100 INJECTION, SOLUTION PARENTERAL at 05:57

## 2018-09-24 NOTE — NUR
PT RECEIVED WITH A PORTEX 9 TRACH ON THE VENT WITH NOTED SETTINGS. PT IS AWAKE BUT DOES NOT 
FOLLOW COMMANDS. VENT ALARMS ARE SET AND AUDIBLE WITH AMBU BAG @BEDSIDE. MLT CUFF PRESSURE 
NOTED. TRACH PATENT AND SECURE.  VENT IS PLUGGED INTO RED OUTLET. BREATHING TX GIVEN. NO 
ADVERSE REACTION NOTED SX'D MODERATE TAN THICK SECRETIONS. NO RESPIRATORY DISTRESS NOTED AT 
THIS TIME, WILL CONTINUE TO MONITOR.

## 2018-09-24 NOTE — NUR
TELE 1 RN NOTE

PT IN BED OBTUNDED. ON VENT/TRACH TOLERATING THE SETTINGS WELL, NO DISTRESS OR DISCOMFORT 
NOTED. NO S/S OF PAIN NOTED. ON TELE MONITOR SR HR 71. RT SUCTIONED THE PT, NOTED THICK 
WHITE SECRETIONS NO BLEEDING NOTED. GTF GLUCERNA 1.2 INFUSING AT 50 ML/HR RESIDUAL 25 ML 
NOTED. KEPT HOB ELEVATED. ALSO D5W WITH 40 MEQ KCL INFUSING  ML/HR LISA PICC LINE. NO 
S/S OF INFILTRATION NOTED. F/C INTACT AND PATENT DRAINING YELLOWISH COLOR URINE. REPOSITION 
HIM FOR SKIN MANAGEMENT. SIDE RAILS UP X 3 AND CALL LIGHT WITHIN REACH. VSS. CONTINUE TO 
MONITOR HIM.

## 2018-09-24 NOTE — NUR
RT



RECEIVED PT TRACH ON VENT, WITH NOTED SETTINGS. MLT DONE AND TRACH IS SECURE. VENT ALARMS 
CHECKED AND AUDIBLE. VENT PLUGGED IN RED OUTLET. AMBU BAG NOTED HOB. SX WITH MOD TO LRG THK 
TAN SECRETIONS. PT ON CONTINUOUS PULSE OX. BREATHING TX GIVEN. PT TOLERATING SETTINGS WELL. 
NO SOB OR RESP DISTRESS NOTED, WILL CONTINUE TO MONITOR T/O SHIFT. 

-------------------------------------------------------------------------------

Addendum: 09/24/18 at 1459 by SIDNEY BURRIS RT

-------------------------------------------------------------------------------

Amended: Links added.

## 2018-09-24 NOTE — NUR
RN NOTE



RECEIVED PATIENT  HOB ELEVATED FOR COMFORT, OBTUNDED, BUT IS ABLE TO OPEN EYES WITH TACTILE 
STIMULI. VENT/TRACH DEPENDENT WITH APPROPRIATE SETTINGS AND SATURATING WELL. ON CARDIAC 
MONITOR SINUS RHYTHM HR OF 80. PICC LINE INTACT AND PATENT WITH ONGOING FLUIDS AS ORDERED. 
GT SITE INTACT AND PATENT WITH ONGOING FEEDINGS AS ORDERED, WITH NO RESIDUAL NOTED. 
GENERALIZED EDEMA TO BUE/BLE, EXTREMITIES KEPT ELEVATED. ALL SAFETY MEASURES DONE. BED LOW 
AND LOCKED POSITION. WILL CONTINUE TO MONITOR.

## 2018-09-24 NOTE — NUR
TELE 1 RN NOTE

PT IN BED OBTUNDED. NO DISTRESS OR DISCOMFORT NOTED. NO S/S OF PAIN NOTED. ON TELE MONITOR 
SR HR 70. KEPT HIM DRY AND CLEAN. ALL NEEDS ATTENDED.  REPOSITION HIM Q2H, SIDE RAILS UP X 3 
AND CALL LIGHT WITHIN REACH. WILL ENDORSE TO DAY SHIFT NURSE FOR CONTINUE TO CARE.

## 2018-09-25 VITALS — SYSTOLIC BLOOD PRESSURE: 146 MMHG | DIASTOLIC BLOOD PRESSURE: 84 MMHG

## 2018-09-25 VITALS — DIASTOLIC BLOOD PRESSURE: 65 MMHG | SYSTOLIC BLOOD PRESSURE: 102 MMHG

## 2018-09-25 VITALS — SYSTOLIC BLOOD PRESSURE: 106 MMHG | DIASTOLIC BLOOD PRESSURE: 63 MMHG

## 2018-09-25 VITALS — DIASTOLIC BLOOD PRESSURE: 78 MMHG | SYSTOLIC BLOOD PRESSURE: 118 MMHG

## 2018-09-25 VITALS — DIASTOLIC BLOOD PRESSURE: 74 MMHG | SYSTOLIC BLOOD PRESSURE: 119 MMHG

## 2018-09-25 VITALS — DIASTOLIC BLOOD PRESSURE: 81 MMHG | SYSTOLIC BLOOD PRESSURE: 127 MMHG

## 2018-09-25 LAB
BASOPHILS # BLD AUTO: 0.1 /CMM (ref 0–0.2)
BASOPHILS NFR BLD AUTO: 1.3 % (ref 0–2)
BUN SERPL-MCNC: 25 MG/DL (ref 7–18)
CALCIUM SERPL-MCNC: 7.8 MG/DL (ref 8.5–10.1)
CHLORIDE SERPL-SCNC: 115 MMOL/L (ref 98–107)
CO2 SERPL-SCNC: 30 MMOL/L (ref 21–32)
CREAT SERPL-MCNC: 1.2 MG/DL (ref 0.6–1.3)
EOSINOPHIL NFR BLD AUTO: 0.2 % (ref 0–6)
GLUCOSE SERPL-MCNC: 155 MG/DL (ref 74–106)
HCT VFR BLD AUTO: 25 % (ref 39–51)
HGB BLD-MCNC: 8.1 G/DL (ref 13.5–17.5)
LYMPHOCYTES NFR BLD AUTO: 2.1 /CMM (ref 0.8–4.8)
LYMPHOCYTES NFR BLD AUTO: 22.5 % (ref 20–44)
MAGNESIUM SERPL-MCNC: 1.8 MG/DL (ref 1.8–2.4)
MCHC RBC AUTO-ENTMCNC: 33 G/DL (ref 31–36)
MCV RBC AUTO: 94 FL (ref 80–96)
MONOCYTES NFR BLD AUTO: 0.9 /CMM (ref 0.1–1.3)
MONOCYTES NFR BLD AUTO: 9.9 % (ref 2–12)
NEUTROPHILS # BLD AUTO: 6.5 /CMM (ref 1.8–8.9)
NEUTROPHILS NFR BLD AUTO: 66.1 % (ref 43–81)
PHOSPHATE SERPL-MCNC: 2.4 MG/DL (ref 2.5–4.9)
PLATELET # BLD AUTO: 435 /CMM (ref 150–450)
POTASSIUM SERPL-SCNC: 3.5 MMOL/L (ref 3.5–5.1)
RBC # BLD AUTO: 2.62 MIL/UL (ref 4.5–6)
RDW COEFFICIENT OF VARIATION: 19 (ref 11.5–15)
SODIUM SERPL-SCNC: 151 MMOL/L (ref 136–145)
WBC NRBC COR # BLD AUTO: 9.6 K/UL (ref 4.3–11)

## 2018-09-25 RX ADMIN — SODIUM CHLORIDE SCH MG: 9 INJECTION, SOLUTION INTRAVENOUS at 16:29

## 2018-09-25 RX ADMIN — HYDROCORTISONE SODIUM SUCCINATE SCH MG: 100 INJECTION, POWDER, FOR SOLUTION INTRAMUSCULAR; INTRAVASCULAR at 09:05

## 2018-09-25 RX ADMIN — TOPIRAMATE SCH MG: 100 TABLET, COATED ORAL at 16:29

## 2018-09-25 RX ADMIN — ALBUTEROL SULFATE SCH MG: 2.5 SOLUTION RESPIRATORY (INHALATION) at 20:08

## 2018-09-25 RX ADMIN — HYDROCORTISONE SODIUM SUCCINATE SCH MG: 100 INJECTION, POWDER, FOR SOLUTION INTRAMUSCULAR; INTRAVASCULAR at 16:29

## 2018-09-25 RX ADMIN — Medication SCH MG: at 20:08

## 2018-09-25 RX ADMIN — INSULIN HUMAN PRN UNIT: 100 INJECTION, SOLUTION PARENTERAL at 17:52

## 2018-09-25 RX ADMIN — Medication SCH EACH: at 23:24

## 2018-09-25 RX ADMIN — SODIUM CHLORIDE, SODIUM LACTATE, POTASSIUM CHLORIDE, AND CALCIUM CHLORIDE PRN MLS/HR: .6; .31; .03; .02 INJECTION, SOLUTION INTRAVENOUS at 11:47

## 2018-09-25 RX ADMIN — DEXTROSE MONOHYDRATE SCH MLS/HR: 50 INJECTION, SOLUTION INTRAVENOUS at 19:57

## 2018-09-25 RX ADMIN — ALBUTEROL SULFATE SCH MG: 2.5 SOLUTION RESPIRATORY (INHALATION) at 07:28

## 2018-09-25 RX ADMIN — LACTOBACILLUS TAB SCH EACH: TAB at 12:01

## 2018-09-25 RX ADMIN — SUCRALFATE SCH G: 1 SUSPENSION ORAL at 21:46

## 2018-09-25 RX ADMIN — Medication PRN ML: at 23:26

## 2018-09-25 RX ADMIN — Medication SCH EACH: at 11:47

## 2018-09-25 RX ADMIN — HYDROCORTISONE SODIUM SUCCINATE SCH MG: 100 INJECTION, POWDER, FOR SOLUTION INTRAMUSCULAR; INTRAVASCULAR at 12:01

## 2018-09-25 RX ADMIN — OXYCODONE HYDROCHLORIDE AND ACETAMINOPHEN SCH MG: 500 TABLET ORAL at 09:05

## 2018-09-25 RX ADMIN — ATORVASTATIN CALCIUM SCH MG: 10 TABLET, FILM COATED ORAL at 09:05

## 2018-09-25 RX ADMIN — ALBUTEROL SULFATE SCH MG: 2.5 SOLUTION RESPIRATORY (INHALATION) at 13:53

## 2018-09-25 RX ADMIN — Medication SCH MG: at 01:30

## 2018-09-25 RX ADMIN — SUCRALFATE SCH G: 1 SUSPENSION ORAL at 16:33

## 2018-09-25 RX ADMIN — LACTOBACILLUS TAB SCH EACH: TAB at 16:29

## 2018-09-25 RX ADMIN — INSULIN HUMAN PRN UNIT: 100 INJECTION, SOLUTION PARENTERAL at 23:24

## 2018-09-25 RX ADMIN — Medication SCH MG: at 07:28

## 2018-09-25 RX ADMIN — Medication SCH OZ: at 09:06

## 2018-09-25 RX ADMIN — INSULIN HUMAN PRN UNIT: 100 INJECTION, SOLUTION PARENTERAL at 12:00

## 2018-09-25 RX ADMIN — LEVETIRACETAM SCH MG: 100 SOLUTION ORAL at 16:29

## 2018-09-25 RX ADMIN — Medication SCH EACH: at 17:57

## 2018-09-25 RX ADMIN — ACETAMINOPHEN PRN MG: 325 TABLET ORAL at 19:57

## 2018-09-25 RX ADMIN — DOCUSATE SODIUM SCH MG: 50 LIQUID ORAL at 09:05

## 2018-09-25 RX ADMIN — SODIUM CHLORIDE, SODIUM LACTATE, POTASSIUM CHLORIDE, AND CALCIUM CHLORIDE PRN MLS/HR: .6; .31; .03; .02 INJECTION, SOLUTION INTRAVENOUS at 20:02

## 2018-09-25 RX ADMIN — TOPIRAMATE SCH MG: 100 TABLET, COATED ORAL at 09:05

## 2018-09-25 RX ADMIN — SUCRALFATE SCH G: 1 SUSPENSION ORAL at 09:05

## 2018-09-25 RX ADMIN — Medication PRN ML: at 04:01

## 2018-09-25 RX ADMIN — SODIUM CHLORIDE SCH MG: 9 INJECTION, SOLUTION INTRAVENOUS at 09:06

## 2018-09-25 RX ADMIN — SODIUM CHLORIDE, SODIUM LACTATE, POTASSIUM CHLORIDE, AND CALCIUM CHLORIDE PRN MLS/HR: .6; .31; .03; .02 INJECTION, SOLUTION INTRAVENOUS at 02:30

## 2018-09-25 RX ADMIN — ALBUTEROL SULFATE SCH MG: 2.5 SOLUTION RESPIRATORY (INHALATION) at 01:30

## 2018-09-25 RX ADMIN — LEVETIRACETAM SCH MG: 100 SOLUTION ORAL at 09:05

## 2018-09-25 RX ADMIN — INSULIN HUMAN PRN UNIT: 100 INJECTION, SOLUTION PARENTERAL at 05:00

## 2018-09-25 RX ADMIN — Medication SCH EACH: at 05:04

## 2018-09-25 RX ADMIN — SUCRALFATE SCH G: 1 SUSPENSION ORAL at 11:46

## 2018-09-25 RX ADMIN — Medication SCH MG: at 13:53

## 2018-09-25 RX ADMIN — LACTOBACILLUS TAB SCH EACH: TAB at 09:05

## 2018-09-25 NOTE — NUR
RN NOTES

PATIENT OBTUNDED, MECHANICAL VENT DEPENDENT AND TOLERATES CURRENT SETTING. TURNED AND 
REPOSITIONED EVERY 2 HOURS, SKIN CARE RENDERED, WOUND TREATMENT RENDERED, OFFLOADED [place]. 
HEELS AND ELBOWS. PICC LINE FLUSHED, 2 PORT PATENT. GTF ONGOING AND TOLERATING WELL, WITH 
RESIDUAL OF 35CC. WATER FLUSHES DONE EVERY 4 HOURS. NEEDS ATTENDED AND MET, CALL LIGHT 
WITHIN REACH, WILL ENDORSE TO NIGHT SHIFT FOR HONEY.

## 2018-09-25 NOTE — NUR
TELE 1 RN NOTE

PT IN BED OBTUNDED. CONTINUE ON VENT/TRACH TOLERATING THE SETTINGS WELL, NO DISTRESS OR 
DISCOMFORT NOTED. NO S/S OF PAIN NOTED. ON TELE MONITOR SR  HR 76. KEPT HIM DRY AND CLEAN. 
ALL NEEDS ATTENDED. PELAYO CATH INTACT AND PATENT DRAINING YELLOWISH COLOR URINE. GTF 
INFUSING WELL, 20 ML RESIDUAL NOTED. REPOSITION HIM Q2H, SIDE RAILS UP X 3 AND CALL LIGHT 
WITHIN REACH. WILL ENDORSE TO DAY SHIFT NURSE FOR CONTINUE TO CARE.

## 2018-09-25 NOTE — NUR
TELE 1 RN NOTE

BODY TEMP CAME DOWN TO 99. NO DISTRESS NOTED. TOLERATING VENT SETTINGS. REPOSITION HIM FOR 
COMFORT.

## 2018-09-25 NOTE — NUR
TELE 1 RN NOTE

PT IN BED OBTUNDED. ON VENT/TRACH TOLERATING THE SETTINGS WELL. NO DISTRESS OR DISCOMFORT 
NOTED. ON TELE MONITOR SR HR 70 F/C INTACT AND PATENT DRAINING YELLOWISH COLOR URINE. GTF 
GLUCERNA 1.2 TOLU INFUSING AT 50 ML/HR, 30 ML RESIDUAL NOTED. GTF FLUSHED AS ORDERED WITH 200 
ML WATER. KEPT HOB ELEVATED. LISA PICC LINE WITH D5W  ML/HR INFUSING WELL. NO S/S OF 
INFILTRATION NOTED. SIDE RAILS UP X 3 AND CALL LIGHT WITHIN REACH. CONTINUE TO MONITOR HIM.

## 2018-09-25 NOTE — NUR
PT REC'D TRACHED ON [facility] VENT ON AC MODE. NO RESP DISTRESS OR SOB NOTED. TRACH PATENT AND 
SECURED. SX'D FOR THICK MOD AMT OF PALE YELLOW SPUTUM. VENT PLUGGED INTO RED OUTLET. ALARMS 
ARE SET AND AUDIBLE. AMBU BAG BEDSIDE. WILL CONTINUE TO MONITOR.

-------------------------------------------------------------------------------

Addendum: 09/26/18 at 0635 by MARIO BALDWIN RT

-------------------------------------------------------------------------------

Amended: Links added.

## 2018-09-26 VITALS — SYSTOLIC BLOOD PRESSURE: 107 MMHG | DIASTOLIC BLOOD PRESSURE: 78 MMHG

## 2018-09-26 VITALS — DIASTOLIC BLOOD PRESSURE: 78 MMHG | SYSTOLIC BLOOD PRESSURE: 161 MMHG

## 2018-09-26 VITALS — DIASTOLIC BLOOD PRESSURE: 64 MMHG | SYSTOLIC BLOOD PRESSURE: 112 MMHG

## 2018-09-26 VITALS — DIASTOLIC BLOOD PRESSURE: 78 MMHG | SYSTOLIC BLOOD PRESSURE: 121 MMHG

## 2018-09-26 LAB
BASOPHILS # BLD AUTO: 0 /CMM (ref 0–0.2)
BASOPHILS NFR BLD AUTO: 0.3 % (ref 0–2)
BUN SERPL-MCNC: 21 MG/DL (ref 7–18)
CALCIUM SERPL-MCNC: 7.7 MG/DL (ref 8.5–10.1)
CHLORIDE SERPL-SCNC: 113 MMOL/L (ref 98–107)
CO2 SERPL-SCNC: 29 MMOL/L (ref 21–32)
CREAT SERPL-MCNC: 1.1 MG/DL (ref 0.6–1.3)
EOSINOPHIL NFR BLD AUTO: 0.1 % (ref 0–6)
GLUCOSE SERPL-MCNC: 204 MG/DL (ref 74–106)
HCT VFR BLD AUTO: 26 % (ref 39–51)
HGB BLD-MCNC: 8.1 G/DL (ref 13.5–17.5)
LYMPHOCYTES NFR BLD AUTO: 1.8 /CMM (ref 0.8–4.8)
LYMPHOCYTES NFR BLD AUTO: 16.5 % (ref 20–44)
MAGNESIUM SERPL-MCNC: 1.8 MG/DL (ref 1.8–2.4)
MCHC RBC AUTO-ENTMCNC: 31 G/DL (ref 31–36)
MCV RBC AUTO: 96 FL (ref 80–96)
MONOCYTES NFR BLD AUTO: 1 /CMM (ref 0.1–1.3)
MONOCYTES NFR BLD AUTO: 8.9 % (ref 2–12)
NEUTROPHILS # BLD AUTO: 8 /CMM (ref 1.8–8.9)
NEUTROPHILS NFR BLD AUTO: 74.2 % (ref 43–81)
PHOSPHATE SERPL-MCNC: 2.1 MG/DL (ref 2.5–4.9)
PLATELET # BLD AUTO: 428 /CMM (ref 150–450)
POTASSIUM SERPL-SCNC: 4 MMOL/L (ref 3.5–5.1)
RBC # BLD AUTO: 2.7 MIL/UL (ref 4.5–6)
RDW COEFFICIENT OF VARIATION: 20.7 (ref 11.5–15)
SODIUM SERPL-SCNC: 148 MMOL/L (ref 136–145)
WBC NRBC COR # BLD AUTO: 10.7 K/UL (ref 4.3–11)

## 2018-09-26 RX ADMIN — Medication SCH MG: at 07:17

## 2018-09-26 RX ADMIN — LEVETIRACETAM SCH MG: 100 SOLUTION ORAL at 08:15

## 2018-09-26 RX ADMIN — DOCUSATE SODIUM SCH MG: 50 LIQUID ORAL at 08:15

## 2018-09-26 RX ADMIN — LACTOBACILLUS TAB SCH EACH: TAB at 08:15

## 2018-09-26 RX ADMIN — HYDROCORTISONE SODIUM SUCCINATE SCH MG: 100 INJECTION, POWDER, FOR SOLUTION INTRAMUSCULAR; INTRAVASCULAR at 08:15

## 2018-09-26 RX ADMIN — LACTOBACILLUS TAB SCH EACH: TAB at 13:22

## 2018-09-26 RX ADMIN — Medication SCH MG: at 01:48

## 2018-09-26 RX ADMIN — TOPIRAMATE SCH MG: 100 TABLET, COATED ORAL at 08:15

## 2018-09-26 RX ADMIN — Medication SCH OZ: at 08:16

## 2018-09-26 RX ADMIN — SODIUM CHLORIDE, SODIUM LACTATE, POTASSIUM CHLORIDE, AND CALCIUM CHLORIDE PRN MLS/HR: .6; .31; .03; .02 INJECTION, SOLUTION INTRAVENOUS at 05:01

## 2018-09-26 RX ADMIN — SODIUM CHLORIDE SCH MG: 9 INJECTION, SOLUTION INTRAVENOUS at 08:15

## 2018-09-26 RX ADMIN — HYDROCORTISONE SODIUM SUCCINATE SCH MG: 100 INJECTION, POWDER, FOR SOLUTION INTRAMUSCULAR; INTRAVASCULAR at 13:22

## 2018-09-26 RX ADMIN — Medication SCH EACH: at 11:44

## 2018-09-26 RX ADMIN — INSULIN HUMAN PRN UNIT: 100 INJECTION, SOLUTION PARENTERAL at 05:47

## 2018-09-26 RX ADMIN — Medication SCH EACH: at 05:45

## 2018-09-26 RX ADMIN — ATORVASTATIN CALCIUM SCH MG: 10 TABLET, FILM COATED ORAL at 08:15

## 2018-09-26 RX ADMIN — ALBUTEROL SULFATE SCH MG: 2.5 SOLUTION RESPIRATORY (INHALATION) at 01:48

## 2018-09-26 RX ADMIN — SUCRALFATE SCH G: 1 SUSPENSION ORAL at 11:44

## 2018-09-26 RX ADMIN — LORAZEPAM PRN MG: 2 INJECTION INTRAMUSCULAR; INTRAVENOUS at 12:55

## 2018-09-26 RX ADMIN — SUCRALFATE SCH G: 1 SUSPENSION ORAL at 07:43

## 2018-09-26 RX ADMIN — OXYCODONE HYDROCHLORIDE AND ACETAMINOPHEN SCH MG: 500 TABLET ORAL at 08:15

## 2018-09-26 RX ADMIN — ALBUTEROL SULFATE SCH MG: 2.5 SOLUTION RESPIRATORY (INHALATION) at 07:17

## 2018-09-26 RX ADMIN — INSULIN HUMAN PRN UNIT: 100 INJECTION, SOLUTION PARENTERAL at 11:47

## 2018-09-26 NOTE — NUR
RN NOTES



REPORT GIVEN TO DESIREE RAMÍREZ([facility])

-------------------------------------------------------------------------------

Addendum: 09/26/18 at 1357 by JENAE CORTEZ RN

-------------------------------------------------------------------------------

ADD

1300 CLARIFIED WITH DESIREE RAMÍREZ'S FIO2=40%

## 2018-09-26 NOTE — NUR
TELE 1 RN NOTE

PT IN BED OBTUNDED. EYES OPEN. BODY TEMP WNL. GTF INFUSING WELL. ON TELE MONITOR SR HR 72. 
ALL NEEDS ATTENDED. SIDE RAILS UP X 3 AND CALL LIGHT WITHIN REACH. WILL ENDORSE TO DAY SHIFT 
NURSE FOR CONTINUE TO CARE.

## 2018-09-26 NOTE — NUR
TELE/RN INITIAL NOTES



RECEIVED PT IN BED, OBTUNDED. WITH INTACT TRACH PORTEX#9; TOLERATING VENT SETTINGS: AC12; 
; FIO2:50; PEEP:5. NO SOB NOTED. SR ON TELE MONITOR. WITH INTACT AND IN PLACED GT,WITH 
ONGOING GTF GLUCERNA 1.2 AT 50 ML/HR, TOLERATING WELL. HOB ELEVATED. WITH ONGOING IVF D50 
WITH 40 MEQS KCL  ML/HR INFUSING WELL ON LISA PICC LINE. SAFETY MEASURES IN PLACED. 
WILL CONT TO MONITOR

-------------------------------------------------------------------------------

Addendum: 09/26/18 at 1345 by JENAE CORTEZ RN

-------------------------------------------------------------------------------

ADD: ERROR ENTRY, FIO2=40%

## 2018-09-26 NOTE — NUR
RN NOTES



DISCHARGE PT TO [facility] IN STABLE CONDITION. VIA AMBULANCE, ACCOMPANIED BY RT AND 
2EMTS.REPORT GIVEN TO EMT/RT. SAFETY MEASURES OBSERVED AT ALL TIMES. LISA PICC LINE KEPT 
INTACT AND PATENT. F/C KEPT IN PLACED. D/C
